# Patient Record
Sex: MALE | URBAN - METROPOLITAN AREA
[De-identification: names, ages, dates, MRNs, and addresses within clinical notes are randomized per-mention and may not be internally consistent; named-entity substitution may affect disease eponyms.]

---

## 2017-09-14 ENCOUNTER — INPATIENT (INPATIENT)
Facility: HOSPITAL | Age: 82
LOS: 0 days | Discharge: HOME | DRG: 379 | End: 2017-09-15
Attending: HOSPITALIST | Admitting: FAMILY MEDICINE
Payer: COMMERCIAL

## 2017-09-14 ENCOUNTER — RESULT REVIEW (OUTPATIENT)
Age: 82
End: 2017-09-14

## 2017-09-14 VITALS
WEIGHT: 173.94 LBS | OXYGEN SATURATION: 98 % | HEIGHT: 71 IN | RESPIRATION RATE: 18 BRPM | HEART RATE: 88 BPM | SYSTOLIC BLOOD PRESSURE: 150 MMHG | TEMPERATURE: 98 F | DIASTOLIC BLOOD PRESSURE: 80 MMHG

## 2017-09-14 DIAGNOSIS — Z96.641 PRESENCE OF RIGHT ARTIFICIAL HIP JOINT: Chronic | ICD-10-CM

## 2017-09-14 DIAGNOSIS — I48.2 CHRONIC ATRIAL FIBRILLATION: ICD-10-CM

## 2017-09-14 DIAGNOSIS — K92.2 GASTROINTESTINAL HEMORRHAGE, UNSPECIFIED: ICD-10-CM

## 2017-09-14 DIAGNOSIS — I10 ESSENTIAL (PRIMARY) HYPERTENSION: ICD-10-CM

## 2017-09-14 LAB
ABO RH CONFIRMATION: SIGNIFICANT CHANGE UP
ALBUMIN SERPL ELPH-MCNC: 3.8 G/DL — SIGNIFICANT CHANGE UP (ref 3.3–5.2)
ALP SERPL-CCNC: 114 U/L — SIGNIFICANT CHANGE UP (ref 40–120)
ALT FLD-CCNC: 40 U/L — SIGNIFICANT CHANGE UP
ANION GAP SERPL CALC-SCNC: 15 MMOL/L — SIGNIFICANT CHANGE UP (ref 5–17)
APTT BLD: 34 SEC — SIGNIFICANT CHANGE UP (ref 27.5–37.4)
AST SERPL-CCNC: 36 U/L — SIGNIFICANT CHANGE UP
BASOPHILS # BLD AUTO: 0 K/UL — SIGNIFICANT CHANGE UP (ref 0–0.2)
BASOPHILS NFR BLD AUTO: 0.1 % — SIGNIFICANT CHANGE UP (ref 0–2)
BILIRUB SERPL-MCNC: 0.8 MG/DL — SIGNIFICANT CHANGE UP (ref 0.4–2)
BLD GP AB SCN SERPL QL: SIGNIFICANT CHANGE UP
BUN SERPL-MCNC: 57 MG/DL — HIGH (ref 8–20)
CALCIUM SERPL-MCNC: 9.5 MG/DL — SIGNIFICANT CHANGE UP (ref 8.6–10.2)
CHLORIDE SERPL-SCNC: 99 MMOL/L — SIGNIFICANT CHANGE UP (ref 98–107)
CO2 SERPL-SCNC: 24 MMOL/L — SIGNIFICANT CHANGE UP (ref 22–29)
CREAT SERPL-MCNC: 0.79 MG/DL — SIGNIFICANT CHANGE UP (ref 0.5–1.3)
EOSINOPHIL # BLD AUTO: 0 K/UL — SIGNIFICANT CHANGE UP (ref 0–0.5)
EOSINOPHIL NFR BLD AUTO: 0.1 % — SIGNIFICANT CHANGE UP (ref 0–5)
GLUCOSE SERPL-MCNC: 132 MG/DL — HIGH (ref 70–115)
HCT VFR BLD CALC: 29.8 % — LOW (ref 42–52)
HCT VFR BLD CALC: 30.5 % — LOW (ref 42–52)
HCT VFR BLD CALC: 33.2 % — LOW (ref 42–52)
HGB BLD-MCNC: 10.2 G/DL — LOW (ref 14–18)
HGB BLD-MCNC: 11.3 G/DL — LOW (ref 14–18)
HGB BLD-MCNC: 9.9 G/DL — LOW (ref 14–18)
INR BLD: 1.84 RATIO — HIGH (ref 0.88–1.16)
INR BLD: 2.05 RATIO — HIGH (ref 0.88–1.16)
INR BLD: 2.63 RATIO — HIGH (ref 0.88–1.16)
LYMPHOCYTES # BLD AUTO: 0.8 K/UL — LOW (ref 1–4.8)
LYMPHOCYTES # BLD AUTO: 9.3 % — LOW (ref 20–55)
MCHC RBC-ENTMCNC: 32.9 PG — HIGH (ref 27–31)
MCHC RBC-ENTMCNC: 33 PG — HIGH (ref 27–31)
MCHC RBC-ENTMCNC: 33.2 G/DL — SIGNIFICANT CHANGE UP (ref 32–36)
MCHC RBC-ENTMCNC: 33.4 G/DL — SIGNIFICANT CHANGE UP (ref 32–36)
MCHC RBC-ENTMCNC: 33.4 PG — HIGH (ref 27–31)
MCHC RBC-ENTMCNC: 34 G/DL — SIGNIFICANT CHANGE UP (ref 32–36)
MCV RBC AUTO: 98.2 FL — HIGH (ref 80–94)
MCV RBC AUTO: 98.7 FL — HIGH (ref 80–94)
MCV RBC AUTO: 99 FL — HIGH (ref 80–94)
MONOCYTES # BLD AUTO: 1.1 K/UL — HIGH (ref 0–0.8)
MONOCYTES NFR BLD AUTO: 12.3 % — HIGH (ref 3–10)
NEUTROPHILS # BLD AUTO: 7 K/UL — SIGNIFICANT CHANGE UP (ref 1.8–8)
NEUTROPHILS NFR BLD AUTO: 78 % — HIGH (ref 37–73)
OB PNL STL: POSITIVE
PLATELET # BLD AUTO: 109 K/UL — LOW (ref 150–400)
PLATELET # BLD AUTO: 109 K/UL — LOW (ref 150–400)
PLATELET # BLD AUTO: 126 K/UL — LOW (ref 150–400)
POTASSIUM SERPL-MCNC: 3.8 MMOL/L — SIGNIFICANT CHANGE UP (ref 3.5–5.3)
POTASSIUM SERPL-SCNC: 3.8 MMOL/L — SIGNIFICANT CHANGE UP (ref 3.5–5.3)
PROT SERPL-MCNC: 6.4 G/DL — LOW (ref 6.6–8.7)
PROTHROM AB SERPL-ACNC: 20.5 SEC — HIGH (ref 9.8–12.7)
PROTHROM AB SERPL-ACNC: 22.9 SEC — HIGH (ref 9.8–12.7)
PROTHROM AB SERPL-ACNC: 29.5 SEC — HIGH (ref 9.8–12.7)
RBC # BLD: 3.01 M/UL — LOW (ref 4.6–6.2)
RBC # BLD: 3.09 M/UL — LOW (ref 4.6–6.2)
RBC # BLD: 3.38 M/UL — LOW (ref 4.6–6.2)
RBC # FLD: 13 % — SIGNIFICANT CHANGE UP (ref 11–15.6)
RBC # FLD: 13.3 % — SIGNIFICANT CHANGE UP (ref 11–15.6)
RBC # FLD: 13.3 % — SIGNIFICANT CHANGE UP (ref 11–15.6)
SODIUM SERPL-SCNC: 138 MMOL/L — SIGNIFICANT CHANGE UP (ref 135–145)
TYPE + AB SCN PNL BLD: SIGNIFICANT CHANGE UP
WBC # BLD: 8.9 K/UL — SIGNIFICANT CHANGE UP (ref 4.8–10.8)
WBC # BLD: 9.1 K/UL — SIGNIFICANT CHANGE UP (ref 4.8–10.8)
WBC # BLD: 9.8 K/UL — SIGNIFICANT CHANGE UP (ref 4.8–10.8)
WBC # FLD AUTO: 8.9 K/UL — SIGNIFICANT CHANGE UP (ref 4.8–10.8)
WBC # FLD AUTO: 9.1 K/UL — SIGNIFICANT CHANGE UP (ref 4.8–10.8)
WBC # FLD AUTO: 9.8 K/UL — SIGNIFICANT CHANGE UP (ref 4.8–10.8)

## 2017-09-14 PROCEDURE — 88305 TISSUE EXAM BY PATHOLOGIST: CPT | Mod: 26

## 2017-09-14 PROCEDURE — 99223 1ST HOSP IP/OBS HIGH 75: CPT

## 2017-09-14 PROCEDURE — 99223 1ST HOSP IP/OBS HIGH 75: CPT | Mod: 25

## 2017-09-14 PROCEDURE — 93010 ELECTROCARDIOGRAM REPORT: CPT

## 2017-09-14 PROCEDURE — 43753 TX GASTRO INTUB W/ASP: CPT

## 2017-09-14 PROCEDURE — 12345: CPT | Mod: NC

## 2017-09-14 PROCEDURE — 88342 IMHCHEM/IMCYTCHM 1ST ANTB: CPT | Mod: 26

## 2017-09-14 PROCEDURE — 99285 EMERGENCY DEPT VISIT HI MDM: CPT | Mod: 25

## 2017-09-14 PROCEDURE — 43239 EGD BIOPSY SINGLE/MULTIPLE: CPT

## 2017-09-14 PROCEDURE — 71010: CPT | Mod: 26

## 2017-09-14 RX ORDER — ATENOLOL 25 MG/1
25 TABLET ORAL DAILY
Qty: 0 | Refills: 0 | Status: DISCONTINUED | OUTPATIENT
Start: 2017-09-14 | End: 2017-09-15

## 2017-09-14 RX ORDER — SODIUM CHLORIDE 9 MG/ML
1000 INJECTION INTRAMUSCULAR; INTRAVENOUS; SUBCUTANEOUS ONCE
Qty: 0 | Refills: 0 | Status: COMPLETED | OUTPATIENT
Start: 2017-09-14 | End: 2017-09-14

## 2017-09-14 RX ORDER — PANTOPRAZOLE SODIUM 20 MG/1
40 TABLET, DELAYED RELEASE ORAL
Qty: 0 | Refills: 0 | Status: DISCONTINUED | OUTPATIENT
Start: 2017-09-14 | End: 2017-09-15

## 2017-09-14 RX ORDER — PANTOPRAZOLE SODIUM 20 MG/1
80 TABLET, DELAYED RELEASE ORAL ONCE
Qty: 0 | Refills: 0 | Status: COMPLETED | OUTPATIENT
Start: 2017-09-14 | End: 2017-09-14

## 2017-09-14 RX ORDER — ERYTHROMYCIN ETHYLSUCCINATE 400 MG
250 TABLET ORAL ONCE
Qty: 0 | Refills: 0 | Status: COMPLETED | OUTPATIENT
Start: 2017-09-14 | End: 2017-09-14

## 2017-09-14 RX ORDER — SODIUM CHLORIDE 9 MG/ML
1000 INJECTION INTRAMUSCULAR; INTRAVENOUS; SUBCUTANEOUS
Qty: 0 | Refills: 0 | Status: DISCONTINUED | OUTPATIENT
Start: 2017-09-14 | End: 2017-09-15

## 2017-09-14 RX ORDER — FENTANYL CITRATE 50 UG/ML
12 INJECTION INTRAVENOUS ONCE
Qty: 0 | Refills: 0 | Status: DISCONTINUED | OUTPATIENT
Start: 2017-09-14 | End: 2017-09-14

## 2017-09-14 RX ORDER — PANTOPRAZOLE SODIUM 20 MG/1
8 TABLET, DELAYED RELEASE ORAL
Qty: 80 | Refills: 0 | Status: DISCONTINUED | OUTPATIENT
Start: 2017-09-14 | End: 2017-09-14

## 2017-09-14 RX ORDER — ONDANSETRON 8 MG/1
4 TABLET, FILM COATED ORAL EVERY 6 HOURS
Qty: 0 | Refills: 0 | Status: DISCONTINUED | OUTPATIENT
Start: 2017-09-14 | End: 2017-09-15

## 2017-09-14 RX ORDER — ATORVASTATIN CALCIUM 80 MG/1
20 TABLET, FILM COATED ORAL AT BEDTIME
Qty: 0 | Refills: 0 | Status: DISCONTINUED | OUTPATIENT
Start: 2017-09-14 | End: 2017-09-15

## 2017-09-14 RX ADMIN — PANTOPRAZOLE SODIUM 80 MILLIGRAM(S): 20 TABLET, DELAYED RELEASE ORAL at 03:30

## 2017-09-14 RX ADMIN — FENTANYL CITRATE 12 MICROGRAM(S): 50 INJECTION INTRAVENOUS at 04:20

## 2017-09-14 RX ADMIN — ATENOLOL 25 MILLIGRAM(S): 25 TABLET ORAL at 18:22

## 2017-09-14 RX ADMIN — Medication 250 MILLIGRAM(S): at 08:35

## 2017-09-14 RX ADMIN — SODIUM CHLORIDE 100 MILLILITER(S): 9 INJECTION INTRAMUSCULAR; INTRAVENOUS; SUBCUTANEOUS at 03:49

## 2017-09-14 RX ADMIN — PANTOPRAZOLE SODIUM 40 MILLIGRAM(S): 20 TABLET, DELAYED RELEASE ORAL at 18:24

## 2017-09-14 RX ADMIN — PANTOPRAZOLE SODIUM 10 MG/HR: 20 TABLET, DELAYED RELEASE ORAL at 03:35

## 2017-09-14 RX ADMIN — FENTANYL CITRATE 12 MICROGRAM(S): 50 INJECTION INTRAVENOUS at 03:57

## 2017-09-14 RX ADMIN — SODIUM CHLORIDE 1000 MILLILITER(S): 9 INJECTION INTRAMUSCULAR; INTRAVENOUS; SUBCUTANEOUS at 02:43

## 2017-09-14 NOTE — CONSULT NOTE ADULT - SUBJECTIVE AND OBJECTIVE BOX
[facility]                                   [address] [place] [address]                                                      PHONE: [phone]                                                         FAX: [phone]  [email or web address]  ---------------------------------------------------------------------------------------------------------------------------------    Reason for Consult: pre op evaluation hx chronic A fib     HPI:  WILFRED SIGALA is an 87y Male with history of HTN CVA chronic A fib on AC HLD remote DVT now presents with several episodes of bloody vomitus that started around 5 pm on 9/13/17.  The patient otherwise is feeling well > 4 METS.  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 1000 milliLiter(s) (100 mL/Hr) IV Continuous <Continuous>  erythromycin   IVPB 250 milliGRAM(s) IV Intermittent once    MEDICATIONS  (PRN):  ondansetron Injectable 4 milliGRAM(s) IV Push every 6 hours PRN Nausea and/or Vomiting      Social History:  Cigarettes:       none             Alchohol:        none         Illicit Drug Abuse:  none    ROS: Negative other than as mentioned in HPI.    Vital Signs Last 24 Hrs  T(C): 36.3 (14 Sep 2017 05:29), Max: 36.6 (14 Sep 2017 01:00)  T(F): 97.4 (14 Sep 2017 05:29), Max: 97.8 (14 Sep 2017 01:00)  HR: 65 (14 Sep 2017 05:29) (65 - 88)  BP: 162/73 (14 Sep 2017 05:29) (134/68 - 162/73)  BP(mean): --  RR: 18 (14 Sep 2017 05:29) (18 - 18)  SpO2: 97% (14 Sep 2017 05:29) (97% - 98%)  ICU Vital Signs Last 24 Hrs  WILFRED SIGALA  I&O's Detail    13 Sep 2017 07:01  -  14 Sep 2017 07:00  --------------------------------------------------------  IN:    Enteral Tube Flush: 500 mL    Sodium Chloride 0.9% IV Bolus: 400 mL    sodium chloride 0.9%.: 100 mL  Total IN: 1000 mL    OUT:  Total OUT: 0 mL    Total NET: 1000 mL        I&O's Summary    13 Sep 2017 07:01  -  14 Sep 2017 07:00  --------------------------------------------------------  IN: 1000 mL / OUT: 0 mL / NET: 1000 mL      Drug Dosing Weight  WILFRED SIGALA      PHYSICAL EXAM:  General: Appears well developed, well nourished alert and cooperative.  HEENT: Head; normocephalic, atraumatic.  Eyes: Pupils reactive, cornea wnl.  Neck: Supple, no nodes adenopathy, no NVD or carotid bruit or thyromegaly.  CARDIOVASCULAR: Normal S1 and S2, 2/6 murmur, rub, gallop or lift.   LUNGS: No rales, rhonchi or wheeze. Normal breath sounds bilaterally.  ABDOMEN: Soft, nontender without mass or organomegaly. bowel sounds normoactive.  EXTREMITIES: No clubbing, cyanosis or edema. Distal pulses wnl.   SKIN: warm and dry with normal turgor.  NEURO: Alert/oriented x 3/normal motor exam. No pathologic reflexes.    PSYCH: normal affect.        LABS:                        10.2   8.9   )-----------( 109      ( 14 Sep 2017 07:05 )             30.5     09-14    138  |  99  |  57.0<H>  ----------------------------<  132<H>  3.8   |  24.0  |  0.79    Ca    9.5      14 Sep 2017 02:34    TPro  6.4<L>  /  Alb  3.8  /  TBili  0.8  /  DBili  x   /  AST  36  /  ALT  40  /  AlkPhos  114  09-14    WILFRED SIGALA      PT/INR - ( 14 Sep 2017 08:43 )   PT: 22.9 sec;   INR: 2.05 ratio         PTT - ( 14 Sep 2017 02:34 )  PTT:34.0 sec      RADIOLOGY & ADDITIONAL STUDIES:    INTERPRETATION OF TELEMETRY (personally reviewed):    ECG: A fib rate control     ECHO: 2016  normal EF mild LVH DDD moderate AI without AS moderate to severe MR/TR mild PHTN    STRESS TEST: 2011 nuclear stress normal perfusion       Assessment and Plan:  In summary, WILFRED SIGALA is an 87y Male with past medical history significant for HTN CVA chronic A fib on AC remote DVT now with significant upper GI bleed awaiting urgent EDG low risk procedure in which patient with moderate CV risk; no evidence of ACS or heart failure thus no active CV contraindications to GI procedure.  Agree with FFP and continue current CV medications

## 2017-09-14 NOTE — H&P ADULT - GASTROINTESTINAL DETAILS
rectal deferred by pt. one done by ER physician is guaic + ve../bowel sounds normal/soft/no distention/nontender

## 2017-09-14 NOTE — H&P ADULT - PROBLEM SELECTOR PLAN 1
pt. has ng tube, on protonix drip, got 2 units ffp in ER. will hold coumadin and celebrex. transfuse as needed. H/H Q 6HR. GI CON[facility] [place].

## 2017-09-14 NOTE — H&P ADULT - ENMT COMMENTS
oral mucosa somewhat dry, no blood noted in oral cavity. no ear discharge. oral mucosa somewhat dry, no blood noted in oral cavity. no ear discharge. + ng tube draining dark reddish  liquid

## 2017-09-14 NOTE — ED ADULT NURSE NOTE - NS TRANSFER PATIENT BELONGINGS
Clothing/Cell Phone/PDA (specify) clothing and personal belongings sent to security by ANKUR Martin pt remains with eye glasses and cell phone upon request./Clothing/Cell Phone/PDA (specify)

## 2017-09-14 NOTE — ED ADULT NURSE NOTE - PMH
Atrial fibrillation    Deaf, left    Hypercholesterolemia    Hypertension    Stroke  x2 (left ear deaf as residual)

## 2017-09-14 NOTE — ED PROVIDER NOTE - ATTENDING CONTRIBUTION TO CARE
88 yo M presents to ED c/o 2 episodes of painless hematemesis at home 1700 yesterday.  Pt with previous hx of UGI in the past while on ASA.  Pt with hx of A-fib and currently on Coumadin.  Pt also admits to dark stools.  Pt denies any assoc abd pain or fever.  On exam Abd soft with no localized tenderness.  Will check labs, NGT, IV Protonix and consult ICU

## 2017-09-14 NOTE — CONSULT NOTE ADULT - SUBJECTIVE AND OBJECTIVE BOX
87y  Male  No Known Allergies    CC; Patient is a 87y old  Male who presented with a chief complaint of coffee ground emisis    HPI:    I was asked to evaluate for ICU admission this 86 y/o male who came to ER as he started with upper GI bleeding in the form of coffee ground emesis for 5 episodes started around 5 pm on 9/13/17. He denies  abdominal pain, state he had some blood per rectum in the form of dark stools. He states that he had similar episode years ago when he was on aspirin. he is now on coumadin for his afib. no other complaints is resting comfortably and is hemodynamically stable vitals with no signs of active bleeding.     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)  History of total right hip replacement    FAMILY HISTORY:  Family history of active rheumatic fever (Father)      Vitals   Vital Signs Last 24 Hrs  T(C): 36.3 (14 Sep 2017 05:29), Max: 36.6 (14 Sep 2017 01:00)  T(F): 97.4 (14 Sep 2017 05:29), Max: 97.8 (14 Sep 2017 01:00)  HR: 65 (14 Sep 2017 05:29) (65 - 88)  BP: 162/73 (14 Sep 2017 05:29) (134/68 - 162/73)  BP(mean): --  RR: 18 (14 Sep 2017 05:29) (18 - 18)  SpO2: 97% (14 Sep 2017 05:29) (97% - 98%)    I&O's Summary    13 Sep 2017 07:01  -  14 Sep 2017 07:00  --------------------------------------------------------  IN: 1000 mL / OUT: 0 mL / NET: 1000 mL        LABS  09-14    138  |  99  |  57.0<H>  ----------------------------<  132<H>  3.8   |  24.0  |  0.79    Ca    9.5      14 Sep 2017 02:34    TPro  6.4<L>  /  Alb  3.8  /  TBili  0.8  /  DBili  x   /  AST  36  /  ALT  40  /  AlkPhos  114  09-14                          10.2   8.9   )-----------( 109      ( 14 Sep 2017 07:05 )             30.5     PT/INR - ( 14 Sep 2017 08:43 )   PT: 22.9 sec;   INR: 2.05 ratio         PTT - ( 14 Sep 2017 02:34 )  PTT:34.0 sec      LIVER FUNCTIONS - ( 14 Sep 2017 02:34 )  Alb: 3.8 g/dL / Pro: 6.4 g/dL / ALK PHOS: 114 U/L / ALT: 40 U/L / AST: 36 U/L / GGT: x           CAPILLARY BLOOD GLUCOSE    VENT SETTINGS   none       Meds  MEDICATIONS  (STANDING):  pantoprazole Infusion 8 mG/Hr (10 mL/Hr) IV Continuous <Continuous>  sodium chloride 0.9%. 1000 milliLiter(s) (100 mL/Hr) IV Continuous <Continuous>  erythromycin   IVPB 250 milliGRAM(s) IV Intermittent once      REVIEW OF SYSTEMS:    CONSTITUTIONAL: No fever, weight loss, or fatigue  EYES: No eye pain, visual disturbances, or discharge  ENMT:  No difficulty hearing, tinnitus, vertigo; No sinus or throat pain  NECK: No pain or stiffness  RESPIRATORY: No cough, wheezing, chills or hemoptysis; No shortness of breath  CARDIOVASCULAR: No chest pain, palpitations, dizziness, or leg swelling  GASTROINTESTINAL: No abdominal or epigastric pain. has had nausea with  hematemesis; No diarrhea or constipation.  possitive melena   GENITOURINARY: No dysuria, frequency, hematuria, or incontinence  NEUROLOGICAL: No headaches, memory loss, loss of strength, numbness, or tremors  SKIN: No itching, burning, rashes, or lesions   LYMPH NODES: No enlarged glands  MUSCULOSKELETAL: No joint pain or swelling; No muscle, back, or extremity pain  PSYCHIATRIC: No depression, anxiety, mood swings, or difficulty sleeping  ALLERY AND IMMUNOLOGIC: No hives or eczema      Physicial Exam:     Constitutional: NAD, well-groomed, well-developed  HEENT: PERRLA, EOMI, no drainage or redness  Neck: No bruits; no thyromegaly or nodules,  No JVD  Back: Normal spine flexure, No CVA tenderness, No deformity or limitation of movement  Respiratory: Breath Sounds equal & clear to percussion & auscultation, no accessory muscle use  Cardiovascular: Regular rate & rhythm, normal S1, S2; no murmurs, gallops or rubs; no S3, S4  Gastrointestinal: Soft, non-tender, non distended no hepatosplenomegaly, normal bowel sounds  Extremities: No peripheral edema, No cyanosis, clubbing   Vascular: Equal and normal pulses: 2+ peripheral pulses throughout  Neurological:   A&O x 3; no sensory, motor  deficits, normal reflexes  Psychiatric: Normal mood, normal affect  Musculoskeletal: No joint pain, swelling or deformity; no limitation of movement  Skin: No rashes

## 2017-09-14 NOTE — ED PROVIDER NOTE - PROGRESS NOTE DETAILS
PA NOTE: PT informed of all results and the need for admission, pt agrees to the need to be treated, pt will be admitted to step down unit, PT agrees to admission and the possibility of needing further treatments. PA NOTE: PT with positive stool guiac, NG tube will be placed. PA NOTE: PT with positive stool guaiac, NG tube placed, tube had coffee-ground.

## 2017-09-14 NOTE — ED ADULT NURSE NOTE - OBJECTIVE STATEMENT
pt reports around 5pm last night had sudden onset of nausea, vomiting and diarrhea. pt described the vomit and diarrhea as crimson purple in color, as he has had in the past about 15 years ago "when I over did the aspirin". pt denies any pain associated with these episodes and presently.

## 2017-09-14 NOTE — CONSULT NOTE ADULT - ASSESSMENT
87 year old man with hemetemesis and melena with history of coumadin and aspirin use. He received 2 units of FFP. Fortunately, bleeding has slowed down. Major differential is PUD, erosive esophagitis, gastritis, duodenitis, unlikely malignancy.     I will recommend    1. 2 large bore IV lines  2. Recheck CBC, PT/INR. Give additional FFP if INR is >1.5  3. Obtain cardiology evaluation  4. Transfuse PRBC if needed to keep Hct >24  5. Hold coumadin  6. Avoid any antiplatelets  7. IV erythromycin 250 mg once  8. Possible EGD today with anesthesia assistance

## 2017-09-14 NOTE — PROGRESS NOTE ADULT - SUBJECTIVE AND OBJECTIVE BOX
WILFRED SIGALA    [id number]    87y      Male    INTERVAL HPI/OVERNIGHT EVENTS:  88 y/o male came to ER as he started having blood vomitus about 5 episodes started around 5 pm on 9/13/17. last couple episodes were lighter than first few. About 2 hours before coming to ER bloody vomitus subsided. no abd. pain. no cp. no blood per rectum.  no dizziness reported.pt. has similar episode about 15 years ago when he was on aspirin. Pt. is now on coumadin for his afib. no other complaints.     This morning, pt with no new complaints. Resting comfortably in bed with NGT - no further vomiting, no blood per rectum, Denies abd pain. Awaiting EGD.    REVIEW OF SYSTEMS:    CONSTITUTIONAL: No fever, weight loss, or fatigue  RESPIRATORY: No cough, wheezing, hemoptysis; No shortness of breath  CARDIOVASCULAR: No chest pain, palpitations  GASTROINTESTINAL: No abdominal or epigastric pain. + nausea, + vomiting  NEUROLOGICAL: No headaches, memory loss, loss of strength.      Vital Signs Last 24 Hrs  T(C): 36.3 (14 Sep 2017 05:29), Max: 36.6 (14 Sep 2017 01:00)  T(F): 97.4 (14 Sep 2017 05:29), Max: 97.8 (14 Sep 2017 01:00)  HR: 65 (14 Sep 2017 05:29) (65 - 88)  BP: 162/73 (14 Sep 2017 05:29) (134/68 - 162/73)  RR: 18 (14 Sep 2017 05:29) (18 - 18)  SpO2: 97% (14 Sep 2017 05:29) (97% - 98%)    PHYSICAL EXAM:    GENERAL: NAD, elderly, frail  HEENT: PERRL, +EOMI  NECK: soft, Supple, No JVD,   CHEST/LUNG: Clear to ascultation bilaterally; No wheezing  HEART: S1S2+, irregularly irregular; + murmur  ABDOMEN: Soft, Nontender, Nondistended; Bowel sounds present, NGT in place to low suction- dark drainage  EXTREMITIES:  2+ Peripheral Pulses, No clubbing, cyanosis, or edema  SKIN: No rashes or lesions  NEURO: AAOX3, no focal deficits, no motor r sensory loss  PSYCH: normal mood      LABS:                        10.2   8.9   )-----------( 109      ( 14 Sep 2017 07:05 )             30.5     09-14    138  |  99  |  57.0<H>  ----------------------------<  132<H>  3.8   |  24.0  |  0.79    Ca    9.5      14 Sep 2017 02:34    TPro  6.4<L>  /  Alb  3.8  /  TBili  0.8  /  DBili  x   /  AST  36  /  ALT  40  /  AlkPhos  114  09-14    PT/INR - ( 14 Sep 2017 08:43 )   PT: 22.9 sec;   INR: 2.05 ratio         PTT - ( 14 Sep 2017 02:34 )  PTT:34.0 sec        MEDICATIONS  (STANDING):  pantoprazole Infusion 8 mG/Hr (10 mL/Hr) IV Continuous <Continuous>  sodium chloride 0.9%. 1000 milliLiter(s) (100 mL/Hr) IV Continuous <Continuous>    MEDICATIONS  (PRN):  ondansetron Injectable 4 milliGRAM(s) IV Push every 6 hours PRN Nausea and/or Vomiting      RADIOLOGY & ADDITIONAL TESTS:    CXR:  Cardiomegaly.    No infiltrates.    Cardiology and GI notes appreciated

## 2017-09-14 NOTE — BRIEF OPERATIVE NOTE - POST-OP DX
Acute gastric ulcer with hemorrhage  09/14/2017    Active  Emory Hernandez  Esophagitis, acute  09/14/2017    Active  Emory Hernandez

## 2017-09-14 NOTE — BRIEF OPERATIVE NOTE - COMMENTS
PPI bid therapy  Clear liquid diet  Treat for H. pylori if positive  Consider repeating EGD in 3 months to document ulcer healing

## 2017-09-14 NOTE — H&P ADULT - HISTORY OF PRESENT ILLNESS
86 y/o male came to ER as he started having blood vomitus about 5 episodes started around 5 pm on 9/13/17. last couple episodes were lighter than first few. About 2 hours before coming to ER bloody vomitus subsided. no abd. pain. no cp. no blood per rectum.  no dizziness reported.pt. has similar episode about 15 years ago when he was on aspirin. Pt. is now on coumadin for his afib. no other complaints.

## 2017-09-14 NOTE — ED PROVIDER NOTE - OBJECTIVE STATEMENT
PT with SPMHx of GI bleed 10 yr ago, A-fib on coumadin, PT with SPMHx of GI bleed 10 yr ago, A-fib on coumadin, presenting to the ED with 2 incidents of vomiting blood 5pm in the afternoon. PT states that the vomits was crimson red and that he has been having BM of the same color. PT states that he has been having bloody  PT states that he is not having any ABd pain but is worried because he has had a decreased appetite, dizziness. PT denies, SOB, CP, Diff breathing, diarrhea, urinary symptoms, back pain. PT with SPMHx of GI bleed 10 yr ago, A-fib on coumadin, presenting to the ED with 2 incidents of vomiting blood 5pm in the afternoon. PT states that the vomits was crimson red and that he has been having BM of the same color. PT states that he has been having bloody stools for a few weeks. PT states that he is not having any ABd pain but is worried because he has had a decreased appetite, dizziness. PT denies, SOB, CP, Diff breathing, diarrhea, urinary symptoms, back pain.

## 2017-09-14 NOTE — PROGRESS NOTE ADULT - PROBLEM SELECTOR PLAN 1
pt. has ng tube, on protonix drip, got 2 units ffp in ER. will hold coumadin and celebrex. transfuse as needed. H/H Q 6HR. GI consulted - possible EGD today

## 2017-09-14 NOTE — ED ADULT TRIAGE NOTE - CHIEF COMPLAINT QUOTE
Pt with vomiting "crimson colored blood and pooping the same color starting tonight", denies abd pain, c/o slight SOB

## 2017-09-14 NOTE — CONSULT NOTE ADULT - SUBJECTIVE AND OBJECTIVE BOX
HPI:  86 y/o pleasant  man came to ER as he started having blood vomitus about 5 episodes started around 5 pm on 9/13/17. He also had melanotic stools. He denies any abdominal pain, chest pain, dizziness or syncopal episodes. He had similar episode about 15 years ago when he was on aspirin. He was admitted to [facility]. No EGD was performed. Pt. is now on coumadin for his afib. He also takes celebrex. He lives at home and takes care of his daughter. He had a colonoscopy performed about 8 years ago and it was normal. He has history of CVA twice and that is why he is on coumadin. He had no residual deficits except hearing loss in left ear. He had a NG tube placed. No repeat episodes of melena. 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  No dyspepsia  No abdominal pain.    No Nausea, +vomiting.  +Bleeding.  +hematemesis +melena  No diarrhea.    No hematochesia.  No weight loss, anorexia.  No edema.      HOME MEDICATIONS:    Coumadin  Celebrex  Crestor  HCTZ  Atenolol  Biotin  Ferrous sulfate  Centrum  Calcitrate    MEDICATIONS  (STANDING):  pantoprazole Infusion 8 mG/Hr (10 mL/Hr) IV Continuous <Continuous>  sodium chloride 0.9%. 1000 milliLiter(s) (100 mL/Hr) IV Continuous <Continuous>  erythromycin   IVPB 250 milliGRAM(s) IV Intermittent once    MEDICATIONS  (PRN):  ondansetron Injectable 4 milliGRAM(s) IV Push every 6 hours PRN Nausea and/or Vomiting      Allergies    No Known Allergies    Intolerances        SOCIAL HISTORY: Ex smoker. Stopped 58 years ago. Ex alchol use. 25 yeara ago. No drugs    ENDOSCOPIC/GI HISTORY: Colonoscopy 8 years ago. No EGD    FAMILY HISTORY:  Family history of active rheumatic fever (Father)  Sister with history of colon cancer      Vital Signs Last 24 Hrs  T(C): 36.3 (14 Sep 2017 05:29), Max: 36.6 (14 Sep 2017 01:00)  T(F): 97.4 (14 Sep 2017 05:29), Max: 97.8 (14 Sep 2017 01:00)  HR: 65 (14 Sep 2017 05:29) (65 - 88)  BP: 162/73 (14 Sep 2017 05:29) (134/68 - 162/73)  BP(mean): --  RR: 18 (14 Sep 2017 05:29) (18 - 18)  SpO2: 97% (14 Sep 2017 05:29) (97% - 98%)    PHYSICAL EXAM:    GENERAL: NAD, well-groomed, well-developed. NG tube in place  HEAD:  Atraumatic, Normocephalic  EYES: EOMI, PERRLA, conjunctiva and sclera clear  ENMT: No tonsillar erythema, exudates, or enlargement; Moist mucous membranes, Good dentition, No lesions  NECK: Supple, No JVD, Normal thyroid  CHEST/LUNG: Clear to percussion bilaterally; No rales, rhonchi, wheezing, or rubs  HEART: Apical and pulmonary area murmur. No rubs, or gallops  ABDOMEN: Soft, Nontender, Nondistended; Bowel sounds present  RECTAL: Refused. It was already performed by the ER physician. Blood +  EXTREMITIES:  2+ Peripheral Pulses, No clubbing, cyanosis, or edema  LYMPH: No lymphadenopathy noted  SKIN: No rashes or lesions      LABS:                        11.3   9.8   )-----------( 126      ( 14 Sep 2017 02:34 )             33.2     09-14    138  |  99  |  57.0<H>  ----------------------------<  132<H>  3.8   |  24.0  |  0.79    Ca    9.5      14 Sep 2017 02:34    TPro  6.4<L>  /  Alb  3.8  /  TBili  0.8  /  DBili  x   /  AST  36  /  ALT  40  /  AlkPhos  114  09-14    PT/INR - ( 14 Sep 2017 02:34 )   PT: 29.5 sec;   INR: 2.63 ratio         PTT - ( 14 Sep 2017 02:34 )  PTT:34.0 sec       LIVER FUNCTIONS - ( 14 Sep 2017 02:34 )  Alb: 3.8 g/dL / Pro: 6.4 g/dL / ALK PHOS: 114 U/L / ALT: 40 U/L / AST: 36 U/L / GGT: x               RADIOLOGY & ADDITIONAL STUDIES:

## 2017-09-14 NOTE — CONSULT NOTE ADULT - ASSESSMENT
87 year old male with upper GI bleeding in form of coffee ground emesis, put out 1 liter of coffee form emesis from insertion of NGT but has not had active bleeding in the ER. Pt is hemodynamically stable vital signs ,no complaints, is fully alert and oriented with stable H/H. Pt at this time does not meet the criteria for admission to ICU.     Recommend admission to step down unit for close monitoring , continue with ng tube, start protonix drip, transfuse 1 unit fresh frozen plasma to lower INR in setting of GI bleeding , call GI consult, hold coumadin, trend H&H closely and transfuse PRBC when hemoglobin significantly drops or signs of bleeding appear.  The above was reviewd with EICU attending Dr Feldman. Please reconsult the MICU team if pt condition declines    Critical Care time: 32 mins assessing presenting problems of acute illness, Medical decision making inculding recommending plan of care, reviewing data, reviewing radiology, discussing with multidisciplinary team, non inclusive of procedures, discussing goals of care with patient.

## 2017-09-14 NOTE — ED ADULT NURSE REASSESSMENT NOTE - NS ED NURSE REASSESS COMMENT FT1
Patient resting in bed comfortably, awake, alert and oriented X3, resp even/unlabored, lungs CTAB, VS stable, afebrile, IV fluids infusing well, cardiac monitor in progress, patient denies any distress, will continue to monitor patient.
Patient received awake and alert x4 in room 17L.  Patient RODRIGUEZ.  Patient has patent iv site in left and right forearm 20g.  Patient has Protonix drip infusing with no difficulty.  Patient denies pain of any kind.  Patient on cardiac monitor reads 70 HR.  Patient O2 sat reads 97% on RA.  Patient denies SOB.  Patient has no labored breathing, patient is in no acute distress.

## 2017-09-14 NOTE — ED PROCEDURE NOTE - CPROC ED GASTRIC INTUB DETAIL1
Gastric tube connected to low continuous suction./The nasogastric tube (see size above) was inserted via the anatomic location./Placement was confirmed by aspiration of gastric secretions./Bowel sounds present to 4 quadrants.

## 2017-09-15 ENCOUNTER — TRANSCRIPTION ENCOUNTER (OUTPATIENT)
Age: 82
End: 2017-09-15

## 2017-09-15 VITALS
DIASTOLIC BLOOD PRESSURE: 61 MMHG | RESPIRATION RATE: 16 BRPM | SYSTOLIC BLOOD PRESSURE: 155 MMHG | HEART RATE: 55 BPM | OXYGEN SATURATION: 99 %

## 2017-09-15 LAB
ANION GAP SERPL CALC-SCNC: 13 MMOL/L — SIGNIFICANT CHANGE UP (ref 5–17)
BUN SERPL-MCNC: 32 MG/DL — HIGH (ref 8–20)
CALCIUM SERPL-MCNC: 8.4 MG/DL — LOW (ref 8.6–10.2)
CHLORIDE SERPL-SCNC: 107 MMOL/L — SIGNIFICANT CHANGE UP (ref 98–107)
CO2 SERPL-SCNC: 23 MMOL/L — SIGNIFICANT CHANGE UP (ref 22–29)
CREAT SERPL-MCNC: 0.74 MG/DL — SIGNIFICANT CHANGE UP (ref 0.5–1.3)
GLUCOSE SERPL-MCNC: 94 MG/DL — SIGNIFICANT CHANGE UP (ref 70–115)
HCT VFR BLD CALC: 30.4 % — LOW (ref 42–52)
HCT VFR BLD CALC: 31.3 % — LOW (ref 42–52)
HGB BLD-MCNC: 9.7 G/DL — LOW (ref 14–18)
HGB BLD-MCNC: 9.9 G/DL — LOW (ref 14–18)
INR BLD: 2.37 RATIO — HIGH (ref 0.88–1.16)
MCHC RBC-ENTMCNC: 31.6 G/DL — LOW (ref 32–36)
MCHC RBC-ENTMCNC: 33 PG — HIGH (ref 27–31)
MCV RBC AUTO: 104.3 FL — HIGH (ref 80–94)
PLATELET # BLD AUTO: 108 K/UL — LOW (ref 150–400)
POTASSIUM SERPL-MCNC: 3.4 MMOL/L — LOW (ref 3.5–5.3)
POTASSIUM SERPL-SCNC: 3.4 MMOL/L — LOW (ref 3.5–5.3)
PROTHROM AB SERPL-ACNC: 26.5 SEC — HIGH (ref 9.8–12.7)
RBC # BLD: 3 M/UL — LOW (ref 4.6–6.2)
RBC # FLD: 13.1 % — SIGNIFICANT CHANGE UP (ref 11–15.6)
SODIUM SERPL-SCNC: 143 MMOL/L — SIGNIFICANT CHANGE UP (ref 135–145)
WBC # BLD: 8.4 K/UL — SIGNIFICANT CHANGE UP (ref 4.8–10.8)
WBC # FLD AUTO: 8.4 K/UL — SIGNIFICANT CHANGE UP (ref 4.8–10.8)

## 2017-09-15 PROCEDURE — 99239 HOSP IP/OBS DSCHRG MGMT >30: CPT

## 2017-09-15 RX ORDER — PANTOPRAZOLE SODIUM 20 MG/1
40 TABLET, DELAYED RELEASE ORAL
Qty: 0 | Refills: 0 | Status: DISCONTINUED | OUTPATIENT
Start: 2017-09-15 | End: 2017-09-15

## 2017-09-15 RX ORDER — POTASSIUM CHLORIDE 20 MEQ
40 PACKET (EA) ORAL ONCE
Qty: 0 | Refills: 0 | Status: COMPLETED | OUTPATIENT
Start: 2017-09-15 | End: 2017-09-15

## 2017-09-15 RX ORDER — PANTOPRAZOLE SODIUM 20 MG/1
1 TABLET, DELAYED RELEASE ORAL
Qty: 60 | Refills: 0 | OUTPATIENT
Start: 2017-09-15 | End: 2017-10-15

## 2017-09-15 RX ORDER — WARFARIN SODIUM 2.5 MG/1
1 TABLET ORAL
Qty: 0 | Refills: 0 | COMMUNITY
Start: 2017-09-15

## 2017-09-15 RX ORDER — CELECOXIB 200 MG/1
1 CAPSULE ORAL
Qty: 0 | Refills: 0 | COMMUNITY

## 2017-09-15 RX ADMIN — Medication 40 MILLIEQUIVALENT(S): at 14:43

## 2017-09-15 RX ADMIN — ATENOLOL 25 MILLIGRAM(S): 25 TABLET ORAL at 05:51

## 2017-09-15 RX ADMIN — PANTOPRAZOLE SODIUM 40 MILLIGRAM(S): 20 TABLET, DELAYED RELEASE ORAL at 05:51

## 2017-09-15 RX ADMIN — SODIUM CHLORIDE 100 MILLILITER(S): 9 INJECTION INTRAMUSCULAR; INTRAVENOUS; SUBCUTANEOUS at 02:44

## 2017-09-15 RX ADMIN — ATORVASTATIN CALCIUM 20 MILLIGRAM(S): 80 TABLET, FILM COATED ORAL at 00:45

## 2017-09-15 NOTE — DISCHARGE NOTE ADULT - CARE PLAN
Principal Discharge DX:	Gastrointestinal hemorrhage, unspecified gastrointestinal hemorrhage type  Goal:	To prevent further episodes of GI bleed.  Instructions for follow-up, activity and diet:	Please continue with medication as prescribed. Follow up with your primary medical doctor and GI Dr Hernandez within 1 week.  Secondary Diagnosis:	Chronic atrial fibrillation  Instructions for follow-up, activity and diet:	Hold coumadin, c/w other home medication. Follow up with your cardiology in 1 week of discharge for further plan on watchman procedure.  Secondary Diagnosis:	Essential hypertension  Instructions for follow-up, activity and diet:	Please continue with medication as prescribed. Follow up with your primary medical doctor within 1 week.  Secondary Diagnosis:	Hypercholesterolemia  Instructions for follow-up, activity and diet:	Please continue with medication as prescribed. Follow up with your primary medical doctor within 1 week.  Secondary Diagnosis:	Cerebrovascular accident (CVA), unspecified mechanism Principal Discharge DX:	Gastrointestinal hemorrhage, unspecified gastrointestinal hemorrhage type  Goal:	To prevent further episodes of GI bleed.  Instructions for follow-up, activity and diet:	Please continue with medication as prescribed. Follow up with your primary medical doctor and GI Dr Hernandez within 1 week.  Secondary Diagnosis:	Chronic atrial fibrillation  Instructions for follow-up, activity and diet:	C/w home medication including coumadin, take 2 mg tonight, 2 mg tomorrow and 3 mg on Sunday night, follow up with your PMD on Monday to check INR and adjust coumadin dose based on INR. Follow up with your cardiology in 1 week of discharge for further plan on watchman procedure.  Secondary Diagnosis:	Essential hypertension  Instructions for follow-up, activity and diet:	Please continue with medication as prescribed. Follow up with your primary medical doctor within 1 week.  Secondary Diagnosis:	Hypercholesterolemia  Instructions for follow-up, activity and diet:	Please continue with medication as prescribed. Follow up with your primary medical doctor within 1 week.  Secondary Diagnosis:	Cerebrovascular accident (CVA), unspecified mechanism

## 2017-09-15 NOTE — DISCHARGE NOTE ADULT - ADDITIONAL INSTRUCTIONS
Follow up with Gi, cardiology and PMD as mentioned above. Follow up with GI, cardiology and PMD as mentioned above.  have your PMD to check INR and also CBC to look for platelets counts.

## 2017-09-15 NOTE — PROGRESS NOTE ADULT - SUBJECTIVE AND OBJECTIVE BOX
INTERVAL HPI/OVERNIGHT EVENTS:    MEDICATIONS  (STANDING):  sodium chloride 0.9%. 1000 milliLiter(s) (100 mL/Hr) IV Continuous <Continuous>  atorvastatin 20 milliGRAM(s) Oral at bedtime  ATENolol  Tablet 25 milliGRAM(s) Oral daily  pantoprazole    Tablet 40 milliGRAM(s) Oral two times a day before meals    MEDICATIONS  (PRN):  ondansetron Injectable 4 milliGRAM(s) IV Push every 6 hours PRN Nausea and/or Vomiting      Allergies    No Known Allergies    Intolerances        Vital Signs Last 24 Hrs  T(C): 36.6 (15 Sep 2017 04:45), Max: 37 (15 Sep 2017 02:22)  T(F): 97.9 (15 Sep 2017 04:45), Max: 98.6 (15 Sep 2017 02:22)  HR: 68 (15 Sep 2017 05:00) (50 - 68)  BP: 140/62 (15 Sep 2017 05:00) (140/62 - 157/70)  BP(mean): 83 (15 Sep 2017 05:00) (83 - 93)  RR: 25 (15 Sep 2017 05:00) (20 - 25)  SpO2: 98% (15 Sep 2017 05:00) (92% - 100%)    LABS:                        9.7    x     )-----------( x        ( 15 Sep 2017 01:23 )             30.4     09-14    138  |  99  |  57.0<H>  ----------------------------<  132<H>  3.8   |  24.0  |  0.79    Ca    9.5      14 Sep 2017 02:34    TPro  6.4<L>  /  Alb  3.8  /  TBili  0.8  /  DBili  x   /  AST  36  /  ALT  40  /  AlkPhos  114  09-14    PT/INR - ( 14 Sep 2017 13:46 )   PT: 20.5 sec;   INR: 1.84 ratio         PTT - ( 14 Sep 2017 02:34 )  PTT:34.0 sec      RADIOLOGY & ADDITIONAL TESTS: INTERVAL HPI/OVERNIGHT EVENTS: F/u for GI bleed. On EGD -clean based gastric antral ulcer. Mild esophagitis. No complaints. No repeat episodes of vomiting, melena. Hct stable.     MEDICATIONS  (STANDING):  sodium chloride 0.9%. 1000 milliLiter(s) (100 mL/Hr) IV Continuous <Continuous>  atorvastatin 20 milliGRAM(s) Oral at bedtime  ATENolol  Tablet 25 milliGRAM(s) Oral daily  pantoprazole    Tablet 40 milliGRAM(s) Oral two times a day before meals    MEDICATIONS  (PRN):  ondansetron Injectable 4 milliGRAM(s) IV Push every 6 hours PRN Nausea and/or Vomiting      Allergies    No Known Allergies    Intolerances        Vital Signs Last 24 Hrs  T(C): 36.6 (15 Sep 2017 04:45), Max: 37 (15 Sep 2017 02:22)  T(F): 97.9 (15 Sep 2017 04:45), Max: 98.6 (15 Sep 2017 02:22)  HR: 68 (15 Sep 2017 05:00) (50 - 68)  BP: 140/62 (15 Sep 2017 05:00) (140/62 - 157/70)  BP(mean): 83 (15 Sep 2017 05:00) (83 - 93)  RR: 25 (15 Sep 2017 05:00) (20 - 25)  SpO2: 98% (15 Sep 2017 05:00) (92% - 100%)    LABS:                        9.7    x     )-----------( x        ( 15 Sep 2017 01:23 )             30.4     09-14    138  |  99  |  57.0<H>  ----------------------------<  132<H>  3.8   |  24.0  |  0.79    Ca    9.5      14 Sep 2017 02:34    TPro  6.4<L>  /  Alb  3.8  /  TBili  0.8  /  DBili  x   /  AST  36  /  ALT  40  /  AlkPhos  114  09-14    PT/INR - ( 14 Sep 2017 13:46 )   PT: 20.5 sec;   INR: 1.84 ratio         PTT - ( 14 Sep 2017 02:34 )  PTT:34.0 sec      RADIOLOGY & ADDITIONAL TESTS:

## 2017-09-15 NOTE — DISCHARGE NOTE ADULT - PLAN OF CARE
To prevent further episodes of GI bleed. Please continue with medication as prescribed. Follow up with your primary medical doctor and GI Dr Hernandez within 1 week. Hold coumadin, c/w other home medication. Follow up with your cardiology in 1 week of discharge for further plan on watchman procedure. Please continue with medication as prescribed. Follow up with your primary medical doctor within 1 week. C/w home medication including coumadin, take 2 mg tonight, 2 mg tomorrow and 3 mg on Sunday night, follow up with your PMD on Monday to check INR and adjust coumadin dose based on INR. Follow up with your cardiology in 1 week of discharge for further plan on watchman procedure.

## 2017-09-15 NOTE — PROGRESS NOTE ADULT - ASSESSMENT
Elderly man with A fib, CVA on coumadin, arthritis on celebrex with GI bleed due to gastric ulcer. Improving. Tolerated clear liquids    Advance diet   Oral PPI bid.  Should be discharged on PPI bid for at least 4 weeks and PPI once daily subsequently  EGD in 3 months to document ulcer healing  Await pathology and treat for H. pylori if positive

## 2017-09-15 NOTE — DISCHARGE NOTE ADULT - HOSPITAL COURSE
87y Male with history of HTN, CVA, chronic A fib on AC, HLD, remote DVT, arthritis on celebrex  presented to ED with hematemesis and melena. Evaluated by GI, underwent EGD which showed mild distal esophagitis and clean base gastric ulcer, pathology pending.  Pt coumadin and celebrex kept on hold. Evaluated by Cardiology and recommended keep coumadin on hold at this point and patient deemed stable for discharge from cardiology POV with outpatient follow up for possible watchman device. Pt also deemed stable for discharge from GI stand point with PPI bid for atleast 4 weeks and then once daily. Recommended repeat EGD in 3 months. Pt to follow with Dr Hernandez an outpatient. Pt stated his daughter is very sick and he has to take care of her, he wants to leave today regardless. Pt was explained about the importance of outpatient follow up with PMD / GI /Cardiology at length and verbalize to understand and will make follow up appointments    PHYSICAL EXAM:    Vital Signs Last 24 Hrs  T(C): 36.6 (15 Sep 2017 11:43), Max: 37 (15 Sep 2017 02:22)  T(F): 97.8 (15 Sep 2017 11:43), Max: 98.6 (15 Sep 2017 02:22)  HR: 44 (15 Sep 2017 08:07) (44 - 68)  BP: 128/67 (15 Sep 2017 08:07) (128/67 - 157/70)  BP(mean): 85 (15 Sep 2017 08:07) (83 - 93)  RR: 18 (15 Sep 2017 08:07) (18 - 25)  SpO2: 100% (15 Sep 2017 08:07) (92% - 100%)    GENERAL: Pt lying comfortably, NAD.  CHEST/LUNG: Clear to auscultatation bilaterally; No wheezing.  HEART: S1S2+, Regular rate and rhythm; No murmurs.  ABDOMEN: Soft, Nontender, Nondistended; Bowel sounds present.  MUSCULOSKELETAL: Normal range of motion.  Extremities: No edema, cyanosis or clubbing.  NEURO: AAOX3, no focal deficits, no motor r sensory loss.  PSYCH: normal mood.    Total time spent on discharge 35 minutes. 87y Male with history of HTN, CVA, chronic A fib on AC, HLD, remote DVT, arthritis on celebrex  presented to ED with hematemesis and melena.  Pt coumadin and celebrex kept on hold. Evaluated by GI, underwent EGD which showed mild distal esophagitis and clean base gastric ulcer, pathology pending. GI recommended OK to resume coumadin given clean base ulcer. Evaluated by Cardiology and recommended continue coumadin if Ok with GI. Patient deemed stable for discharge from cardiology POV with outpatient follow up for possible watchman device. Pt also deemed stable for discharge from GI stand point with PPI bid for atleast 4 weeks and then once daily. Recommended repeat EGD in 3 months. Pt to follow with Dr Hernandez an outpatient. Pt stated his daughter is very sick and he has to take care of her, he wants to leave today regardless. Pt was explained about the importance of outpatient follow up with PMD / GI /Cardiology at length and verbalize to understand and will make follow up appointments. Pt advised to see PMD on Monday to check INR and adjust dose per INR reading.    PHYSICAL EXAM:    Vital Signs Last 24 Hrs  T(C): 36.6 (15 Sep 2017 11:43), Max: 37 (15 Sep 2017 02:22)  T(F): 97.8 (15 Sep 2017 11:43), Max: 98.6 (15 Sep 2017 02:22)  HR: 44 (15 Sep 2017 08:07) (44 - 68)  BP: 128/67 (15 Sep 2017 08:07) (128/67 - 157/70)  BP(mean): 85 (15 Sep 2017 08:07) (83 - 93)  RR: 18 (15 Sep 2017 08:07) (18 - 25)  SpO2: 100% (15 Sep 2017 08:07) (92% - 100%)    GENERAL: Pt lying comfortably, NAD.  CHEST/LUNG: Clear to auscultation bilaterally; No wheezing.  HEART: S1S2+, Regular rate and rhythm; No murmurs.  ABDOMEN: Soft, Nontender, Nondistended; Bowel sounds present.  MUSCULOSKELETAL: Normal range of motion.  Extremities: No edema, cyanosis or clubbing.  NEURO: AAOX3, no focal deficits, no motor r sensory loss.  PSYCH: normal mood.    Total time spent on discharge 35 minutes. 87y Male with history of HTN, CVA, chronic A fib on AC, HLD, remote DVT, arthritis on celebrex  presented to ED with hematemesis and melena.  Pt coumadin and celebrex kept on hold. Evaluated by GI, underwent EGD which showed mild distal esophagitis and clean base gastric ulcer, pathology pending. GI recommended OK to resume coumadin given clean base ulcer. Evaluated by Cardiology and recommended continue coumadin if Ok with GI. Patient deemed stable for discharge from cardiology POV with outpatient follow up for possible watchman device. Pt also deemed stable for discharge from GI stand point with PPI bid for atleast 4 weeks and then once daily. Recommended repeat EGD in 3 months. Pt to follow with Dr Hernandez an outpatient. Pt also noted to have thrombocytopenia, baseline plts unkonwn, in 2014 its was 152, ? baseline thrombocytopenia.  Pt stated his daughter is very sick and he has to take care of her, he wants to leave today regardless. Pt was explained about the importance of outpatient follow up with PMD / GI /Cardiology at length and verbalize to understand and will make follow up appointments. Pt advised to see PMD on Monday to check INR and adjust dose per INR reading. Also have your PMD to repeat CBC and monitor hemoglobin and platelet s count.    PHYSICAL EXAM:    Vital Signs Last 24 Hrs  T(C): 36.6 (15 Sep 2017 11:43), Max: 37 (15 Sep 2017 02:22)  T(F): 97.8 (15 Sep 2017 11:43), Max: 98.6 (15 Sep 2017 02:22)  HR: 44 (15 Sep 2017 08:07) (44 - 68)  BP: 128/67 (15 Sep 2017 08:07) (128/67 - 157/70)  BP(mean): 85 (15 Sep 2017 08:07) (83 - 93)  RR: 18 (15 Sep 2017 08:07) (18 - 25)  SpO2: 100% (15 Sep 2017 08:07) (92% - 100%)    GENERAL: Pt lying comfortably, NAD.  CHEST/LUNG: Clear to auscultation bilaterally; No wheezing.  HEART: S1S2+, Regular rate and rhythm; No murmurs.  ABDOMEN: Soft, Nontender, Nondistended; Bowel sounds present.  MUSCULOSKELETAL: Normal range of motion.  Extremities: No edema, cyanosis or clubbing.  NEURO: AAOX3, no focal deficits, no motor r sensory loss.  PSYCH: normal mood.    Total time spent on discharge 35 minutes.

## 2017-09-15 NOTE — DISCHARGE NOTE ADULT - PATIENT PORTAL LINK FT
“You can access the Follow[facility] Patient Portal, offered by [facility], by registering with the following website: [email or web address]”

## 2017-09-15 NOTE — DISCHARGE NOTE ADULT - MEDICATION SUMMARY - MEDICATIONS TO TAKE
I will START or STAY ON the medications listed below when I get home from the hospital:    atenolol 25 mg oral tablet  -- 1 tab(s) by mouth once a day  -- Indication: For A fib / HTN    hydrochlorothiazide 12.5 mg oral tablet  -- 1 tab(s) by mouth once a day  -- Indication: For HTN    ferrous sulfate 325 mg (65 mg elemental iron) oral delayed release tablet  -- 1 tab(s) by mouth once a day  -- Indication: For Home meds    pantoprazole 40 mg oral delayed release tablet  -- 1 tab(s) by mouth 2 times a day (before meals)  -- Indication: For Gastric ulcer    Centrum Silver oral tablet  -- 1 tab(s) by mouth once a day  -- Indication: For Home meds    Calcitrate with D  -- Indication: For Home meds    biotin 1000 mcg oral tablet  -- 2 tab(s) by mouth once a day  -- Indication: For Home meds I will START or STAY ON the medications listed below when I get home from the hospital:    Coumadin 2 mg oral tablet  -- 1 tab(s) by mouth once a day  -- Indication: For A fib    atenolol 25 mg oral tablet  -- 1 tab(s) by mouth once a day  -- Indication: For A fib / HTN    hydrochlorothiazide 12.5 mg oral tablet  -- 1 tab(s) by mouth once a day  -- Indication: For HTN    ferrous sulfate 325 mg (65 mg elemental iron) oral delayed release tablet  -- 1 tab(s) by mouth once a day  -- Indication: For Home meds    pantoprazole 40 mg oral delayed release tablet  -- 1 tab(s) by mouth 2 times a day (before meals)  -- Indication: For Gastric ulcer    Centrum Silver oral tablet  -- 1 tab(s) by mouth once a day  -- Indication: For Home meds    Calcitrate with D  -- Indication: For Home meds    biotin 1000 mcg oral tablet  -- 2 tab(s) by mouth once a day  -- Indication: For Home meds

## 2017-09-15 NOTE — DISCHARGE NOTE ADULT - CARE PROVIDER_API CALL
Pierre Montes), Medicine  [address] [place] [address]  Phone: [phone]  Fax: [phone]    Blane Newman), Cardiovascular Disease; Internal Medicine  [address] [place] [address]  Phone: [phone]  Fax: [phone]    Emory Hernandez), Gastroenterology; Internal Medicine  [address] [place] [address]  Phone: [phone]  Fax: [phone]

## 2017-09-15 NOTE — DISCHARGE NOTE ADULT - MEDICATION SUMMARY - MEDICATIONS TO STOP TAKING
I will STOP taking the medications listed below when I get home from the hospital:    Coumadin 4 mg oral tablet  -- 1 tab(s) by mouth once a day  2 tab 3x week  1.5 tab 4x week    CeleBREX 200 mg oral capsule  -- 1 cap(s) by mouth once a day I will STOP taking the medications listed below when I get home from the hospital:    CeleBREX 200 mg oral capsule  -- 1 cap(s) by mouth once a day

## 2017-09-15 NOTE — PROGRESS NOTE ADULT - SUBJECTIVE AND OBJECTIVE BOX
[facility] - [facility], THE HEART CENTER                                   [address] [place] [address]                                                      PHONE: [phone]                                                         FAX: [phone]  [email or web address]  ---------------------------------------------------------------------------------------------------------------------------------    Overnight events/patient complaints:      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 1000 milliLiter(s) (100 mL/Hr) IV Continuous <Continuous>  atorvastatin 20 milliGRAM(s) Oral at bedtime  ATENolol  Tablet 25 milliGRAM(s) Oral daily  pantoprazole    Tablet 40 milliGRAM(s) Oral two times a day before meals    MEDICATIONS  (PRN):  ondansetron Injectable 4 milliGRAM(s) IV Push every 6 hours PRN Nausea and/or Vomiting      Vital Signs Last 24 Hrs  T(C): 36.7 (15 Sep 2017 08:03), Max: 37 (15 Sep 2017 02:22)  T(F): 98.1 (15 Sep 2017 08:03), Max: 98.6 (15 Sep 2017 02:22)  HR: 44 (15 Sep 2017 08:07) (44 - 68)  BP: 128/67 (15 Sep 2017 08:07) (128/67 - 157/70)  BP(mean): 85 (15 Sep 2017 08:07) (83 - 93)  RR: 18 (15 Sep 2017 08:07) (18 - 25)  SpO2: 100% (15 Sep 2017 08:07) (92% - 100%)  ICU Vital Signs Last 24 Hrs  WILFRED SIGALA  I&O's Detail    14 Sep 2017 07:01  -  15 Sep 2017 07:00  --------------------------------------------------------  IN:    sodium chloride 0.9%.: 500 mL  Total IN: 500 mL    OUT:  Total OUT: 0 mL    Total NET: 500 mL      15 Sep 2017 07:01  -  15 Sep 2017 10:34  --------------------------------------------------------  IN:    sodium chloride 0.9%.: 200 mL  Total IN: 200 mL    OUT:  Total OUT: 0 mL    Total NET: 200 mL        I&O's Summary    14 Sep 2017 07:01  -  15 Sep 2017 07:00  --------------------------------------------------------  IN: 500 mL / OUT: 0 mL / NET: 500 mL    15 Sep 2017 07:01  -  15 Sep 2017 10:34  --------------------------------------------------------  IN: 200 mL / OUT: 0 mL / NET: 200 mL      Drug Dosing Weight  WILFRED SIGALA      PHYSICAL EXAM:  General: Appears well developed, well nourished alert and cooperative.  HEENT: Head; normocephalic, atraumatic.  Eyes: Pupils reactive, cornea wnl.  Neck: Supple, no nodes adenopathy, no NVD or carotid bruit or thyromegaly.  CARDIOVASCULAR: Normal S1 and S2, No murmur, rub, gallop or lift.   LUNGS: No rales, rhonchi or wheeze. Normal breath sounds bilaterally.  ABDOMEN: Soft, nontender without mass or organomegaly. bowel sounds normoactive.  EXTREMITIES: No clubbing, cyanosis or edema. Distal pulses wnl.   SKIN: warm and dry with normal turgor.  NEURO: Alert/oriented x 3/normal motor exam. No pathologic reflexes.    PSYCH: normal affect.        LABS:                        9.7    x     )-----------( x        ( 15 Sep 2017 01:23 )             30.4     09-15    143  |  107  |  32.0<H>  ----------------------------<  94  3.4<L>   |  23.0  |  0.74    Ca    8.4<L>      15 Sep 2017 08:34    TPro  6.4<L>  /  Alb  3.8  /  TBili  0.8  /  DBili  x   /  AST  36  /  ALT  40  /  AlkPhos  114  09-14    WILFRED SIGALA      PT/INR - ( 15 Sep 2017 08:45 )   PT: 26.5 sec;   INR: 2.37 ratio         PTT - ( 14 Sep 2017 02:34 )  PTT:34.0 sec      RADIOLOGY & ADDITIONAL STUDIES:    INTERPRETATION OF TELEMETRY (personally reviewed):       ASSESSMENT AND PLAN:  In summary, In summary, WILFRED SIGALA is an 87y Male with past medical history significant for HTN CVA chronic A fib on AC remote DVT now with significant upper GI bleed    s/p EGD - clean based gastric ulcer  would hold coumadin at this point  will sdiscuss watchman device for this pt       Thank you for allowing [facility] to participate in the care of this patient.  Please feel free to call with any questions or concerns. [place] [facility] - [facility], THE HEART CENTER                                   [address] [place] [address]                                                      PHONE: [phone]                                                         FAX: [phone]  [email or web address]  ---------------------------------------------------------------------------------------------------------------------------------    Overnight events/patient complaints:  no events     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 1000 milliLiter(s) (100 mL/Hr) IV Continuous <Continuous>  atorvastatin 20 milliGRAM(s) Oral at bedtime  ATENolol  Tablet 25 milliGRAM(s) Oral daily  pantoprazole    Tablet 40 milliGRAM(s) Oral two times a day before meals    MEDICATIONS  (PRN):  ondansetron Injectable 4 milliGRAM(s) IV Push every 6 hours PRN Nausea and/or Vomiting      Vital Signs Last 24 Hrs  T(C): 36.7 (15 Sep 2017 08:03), Max: 37 (15 Sep 2017 02:22)  T(F): 98.1 (15 Sep 2017 08:03), Max: 98.6 (15 Sep 2017 02:22)  HR: 44 (15 Sep 2017 08:07) (44 - 68)  BP: 128/67 (15 Sep 2017 08:07) (128/67 - 157/70)  BP(mean): 85 (15 Sep 2017 08:07) (83 - 93)  RR: 18 (15 Sep 2017 08:07) (18 - 25)  SpO2: 100% (15 Sep 2017 08:07) (92% - 100%)  ICU Vital Signs Last 24 Hrs  WILFRED SIGALA  I&O's Detail    14 Sep 2017 07:01  -  15 Sep 2017 07:00  --------------------------------------------------------  IN:    sodium chloride 0.9%.: 500 mL  Total IN: 500 mL    OUT:  Total OUT: 0 mL    Total NET: 500 mL      15 Sep 2017 07:01  -  15 Sep 2017 10:34  --------------------------------------------------------  IN:    sodium chloride 0.9%.: 200 mL  Total IN: 200 mL    OUT:  Total OUT: 0 mL    Total NET: 200 mL        I&O's Summary    14 Sep 2017 07:01  -  15 Sep 2017 07:00  --------------------------------------------------------  IN: 500 mL / OUT: 0 mL / NET: 500 mL    15 Sep 2017 07:01  -  15 Sep 2017 10:34  --------------------------------------------------------  IN: 200 mL / OUT: 0 mL / NET: 200 mL      Drug Dosing Weight  WILFRED SIGALA      PHYSICAL EXAM:  General: Appears well developed, well nourished alert and cooperative.  HEENT: Head; normocephalic, atraumatic.  Eyes: Pupils reactive, cornea wnl.  Neck: Supple, no nodes adenopathy, no NVD or carotid bruit or thyromegaly.  CARDIOVASCULAR: Normal S1 and S2, No murmur, rub, gallop or lift.   LUNGS: No rales, rhonchi or wheeze. Normal breath sounds bilaterally.  ABDOMEN: Soft, nontender without mass or organomegaly. bowel sounds normoactive.  EXTREMITIES: No clubbing, cyanosis or edema. Distal pulses wnl.   SKIN: warm and dry with normal turgor.  NEURO: Alert/oriented x 3/normal motor exam. No pathologic reflexes.    PSYCH: normal affect.        LABS:                        9.7    x     )-----------( x        ( 15 Sep 2017 01:23 )             30.4     09-15    143  |  107  |  32.0<H>  ----------------------------<  94  3.4<L>   |  23.0  |  0.74    Ca    8.4<L>      15 Sep 2017 08:34    TPro  6.4<L>  /  Alb  3.8  /  TBili  0.8  /  DBili  x   /  AST  36  /  ALT  40  /  AlkPhos  114  09-14    WILFRED SIGALA      PT/INR - ( 15 Sep 2017 08:45 )   PT: 26.5 sec;   INR: 2.37 ratio         PTT - ( 14 Sep 2017 02:34 )  PTT:34.0 sec      RADIOLOGY & ADDITIONAL STUDIES:    INTERPRETATION OF TELEMETRY (personally reviewed):       ASSESSMENT AND PLAN:  In summary, In summary, WILFRED SIGALA is an 87y Male with past medical history significant for HTN CVA chronic A fib on AC remote DVT now with significant upper GI bleed    s/p EGD - clean based gastric ulcer  would hold coumadin at this point  will discuss watchman device for this pt as an outpt  pt stable for d/c home from a cardiac perspective     Thank you for allowing [facility] to participate in the care of this patient.  Please feel free to call with any questions or concerns.

## 2017-09-15 NOTE — DISCHARGE NOTE ADULT - SECONDARY DIAGNOSIS.
Chronic atrial fibrillation Essential hypertension Hypercholesterolemia Cerebrovascular accident (CVA), unspecified mechanism

## 2017-10-19 PROCEDURE — 36430 TRANSFUSION BLD/BLD COMPNT: CPT

## 2017-10-19 PROCEDURE — 85018 HEMOGLOBIN: CPT

## 2017-10-19 PROCEDURE — 80053 COMPREHEN METABOLIC PANEL: CPT

## 2017-10-19 PROCEDURE — 99285 EMERGENCY DEPT VISIT HI MDM: CPT | Mod: 25

## 2017-10-19 PROCEDURE — 96374 THER/PROPH/DIAG INJ IV PUSH: CPT | Mod: XU

## 2017-10-19 PROCEDURE — 93005 ELECTROCARDIOGRAM TRACING: CPT

## 2017-10-19 PROCEDURE — 88342 IMHCHEM/IMCYTCHM 1ST ANTB: CPT

## 2017-10-19 PROCEDURE — 88305 TISSUE EXAM BY PATHOLOGIST: CPT

## 2017-10-19 PROCEDURE — 86900 BLOOD TYPING SEROLOGIC ABO: CPT

## 2017-10-19 PROCEDURE — 43239 EGD BIOPSY SINGLE/MULTIPLE: CPT

## 2017-10-19 PROCEDURE — 85610 PROTHROMBIN TIME: CPT

## 2017-10-19 PROCEDURE — 36415 COLL VENOUS BLD VENIPUNCTURE: CPT

## 2017-10-19 PROCEDURE — 71045 X-RAY EXAM CHEST 1 VIEW: CPT

## 2017-10-19 PROCEDURE — 80048 BASIC METABOLIC PNL TOTAL CA: CPT

## 2017-10-19 PROCEDURE — 86901 BLOOD TYPING SEROLOGIC RH(D): CPT

## 2017-10-19 PROCEDURE — 82272 OCCULT BLD FECES 1-3 TESTS: CPT

## 2017-10-19 PROCEDURE — 43753 TX GASTRO INTUB W/ASP: CPT

## 2017-10-19 PROCEDURE — 86850 RBC ANTIBODY SCREEN: CPT

## 2017-10-19 PROCEDURE — P9059: CPT

## 2017-10-19 PROCEDURE — 85730 THROMBOPLASTIN TIME PARTIAL: CPT

## 2017-10-19 PROCEDURE — 85027 COMPLETE CBC AUTOMATED: CPT

## 2017-10-19 PROCEDURE — 96375 TX/PRO/DX INJ NEW DRUG ADDON: CPT | Mod: XU

## 2018-01-23 PROBLEM — I48.91 UNSPECIFIED ATRIAL FIBRILLATION: Chronic | Status: ACTIVE | Noted: 2017-09-14

## 2018-01-23 PROBLEM — H91.92 UNSPECIFIED HEARING LOSS, LEFT EAR: Chronic | Status: ACTIVE | Noted: 2017-09-14

## 2018-01-25 ENCOUNTER — OUTPATIENT (OUTPATIENT)
Dept: OUTPATIENT SERVICES | Facility: HOSPITAL | Age: 83
LOS: 1 days | End: 2018-01-25
Payer: MEDICARE

## 2018-01-25 VITALS
HEART RATE: 51 BPM | RESPIRATION RATE: 16 BRPM | WEIGHT: 171.96 LBS | OXYGEN SATURATION: 99 % | SYSTOLIC BLOOD PRESSURE: 122 MMHG | HEIGHT: 71 IN | DIASTOLIC BLOOD PRESSURE: 60 MMHG | TEMPERATURE: 97 F

## 2018-01-25 DIAGNOSIS — I48.91 UNSPECIFIED ATRIAL FIBRILLATION: ICD-10-CM

## 2018-01-25 DIAGNOSIS — Z95.828 PRESENCE OF OTHER VASCULAR IMPLANTS AND GRAFTS: Chronic | ICD-10-CM

## 2018-01-25 DIAGNOSIS — G56.01 CARPAL TUNNEL SYNDROME, RIGHT UPPER LIMB: ICD-10-CM

## 2018-01-25 DIAGNOSIS — Z96.641 PRESENCE OF RIGHT ARTIFICIAL HIP JOINT: Chronic | ICD-10-CM

## 2018-01-25 DIAGNOSIS — E78.00 PURE HYPERCHOLESTEROLEMIA, UNSPECIFIED: ICD-10-CM

## 2018-01-25 DIAGNOSIS — G56.00 CARPAL TUNNEL SYNDROME, UNSPECIFIED UPPER LIMB: ICD-10-CM

## 2018-01-25 DIAGNOSIS — I10 ESSENTIAL (PRIMARY) HYPERTENSION: ICD-10-CM

## 2018-01-25 DIAGNOSIS — I63.9 CEREBRAL INFARCTION, UNSPECIFIED: ICD-10-CM

## 2018-01-25 LAB
APTT BLD: 29.6 SEC — SIGNIFICANT CHANGE UP (ref 27.5–37.4)
BUN SERPL-MCNC: 18 MG/DL — SIGNIFICANT CHANGE UP (ref 7–23)
CALCIUM SERPL-MCNC: 9.6 MG/DL — SIGNIFICANT CHANGE UP (ref 8.4–10.5)
CHLORIDE SERPL-SCNC: 101 MMOL/L — SIGNIFICANT CHANGE UP (ref 98–107)
CO2 SERPL-SCNC: 29 MMOL/L — SIGNIFICANT CHANGE UP (ref 22–31)
CREAT SERPL-MCNC: 0.82 MG/DL — SIGNIFICANT CHANGE UP (ref 0.5–1.3)
GLUCOSE SERPL-MCNC: 106 MG/DL — HIGH (ref 70–99)
HCT VFR BLD CALC: 43.2 % — SIGNIFICANT CHANGE UP (ref 39–50)
HGB BLD-MCNC: 14.2 G/DL — SIGNIFICANT CHANGE UP (ref 13–17)
INR BLD: 1.22 — HIGH (ref 0.88–1.17)
MCHC RBC-ENTMCNC: 32.6 PG — SIGNIFICANT CHANGE UP (ref 27–34)
MCHC RBC-ENTMCNC: 32.9 % — SIGNIFICANT CHANGE UP (ref 32–36)
MCV RBC AUTO: 99.3 FL — SIGNIFICANT CHANGE UP (ref 80–100)
NRBC # FLD: 0 — SIGNIFICANT CHANGE UP
PLATELET # BLD AUTO: 172 K/UL — SIGNIFICANT CHANGE UP (ref 150–400)
PMV BLD: 11.4 FL — SIGNIFICANT CHANGE UP (ref 7–13)
POTASSIUM SERPL-MCNC: 4.3 MMOL/L — SIGNIFICANT CHANGE UP (ref 3.5–5.3)
POTASSIUM SERPL-SCNC: 4.3 MMOL/L — SIGNIFICANT CHANGE UP (ref 3.5–5.3)
PROTHROM AB SERPL-ACNC: 13.6 SEC — HIGH (ref 9.8–13.1)
RBC # BLD: 4.35 M/UL — SIGNIFICANT CHANGE UP (ref 4.2–5.8)
RBC # FLD: 13.2 % — SIGNIFICANT CHANGE UP (ref 10.3–14.5)
SODIUM SERPL-SCNC: 142 MMOL/L — SIGNIFICANT CHANGE UP (ref 135–145)
WBC # BLD: 6.78 K/UL — SIGNIFICANT CHANGE UP (ref 3.8–10.5)
WBC # FLD AUTO: 6.78 K/UL — SIGNIFICANT CHANGE UP (ref 3.8–10.5)

## 2018-01-25 PROCEDURE — 93010 ELECTROCARDIOGRAM REPORT: CPT

## 2018-01-25 RX ORDER — MULTIVIT-MIN/FERROUS GLUCONATE 9 MG/15 ML
1 LIQUID (ML) ORAL
Qty: 0 | Refills: 0 | COMMUNITY

## 2018-01-25 RX ORDER — FERROUS SULFATE 325(65) MG
1 TABLET ORAL
Qty: 0 | Refills: 0 | COMMUNITY

## 2018-01-25 NOTE — H&P PST ADULT - PSH
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

## 2018-01-25 NOTE — H&P PST ADULT - PMH
Atrial fibrillation    Carpal tunnel syndrome    Deaf, left    Gastric ulcer  2017  Hypercholesterolemia    Hypertension    OA (osteoarthritis)    Stroke  x2 (left ear deaf as residual) 2012

## 2018-01-25 NOTE — H&P PST ADULT - LYMPHATIC
anterior cervical R/supraclavicular L/posterior cervical R/supraclavicular R/posterior cervical L/anterior cervical L

## 2018-01-25 NOTE — H&P PST ADULT - NEGATIVE NEUROLOGICAL SYMPTOMS
no vertigo/no loss of sensation/no headache/no transient paralysis/no weakness/no paresthesias/no generalized seizures/no difficulty walking

## 2018-01-25 NOTE — H&P PST ADULT - NEGATIVE CARDIOVASCULAR SYMPTOMS
no dyspnea on exertion/no chest pain/no peripheral edema/no claudication/no paroxysmal nocturnal dyspnea/no orthopnea/no palpitations

## 2018-01-25 NOTE — H&P PST ADULT - PROBLEM SELECTOR PLAN 2
Case discussed with Dr. Barraza, Dr. Barraza states to keep patient off coumadin tonight and have patient be evaluated by cardiologist tomorrow.  Pt instructed to obtain cardiac eval preop, pt able to verbalize understanding.  Called pt's cardiologist, made appointment for patient to be seen for cardiac eval preop.

## 2018-01-25 NOTE — H&P PST ADULT - HISTORY OF PRESENT ILLNESS
88 yo male with PMH hypertension, hyperlipidemia, atrial fibrillation and hemorraghic stroke x2 in 2012 presents to pre surgical testing.  Pt reports he has been experiencing right hand pain and numbness for about 2 years, progressively becoming worse.  Pt reports he was diagnosed with carpal tunnel syndrome.   Pt is scheduled for right endoscopic carpal tunnel release on 1/29/18.  Patient is a poor historian. 86 yo male with PMH hypertension, hyperlipidemia, atrial fibrillation and hemorraghic stroke x2 in 2012 presents to pre surgical testing.  Pt reports he has been experiencing right hand pain and numbness for about 2 years, progressively becoming worse.  Pt reports he was diagnosed with carpal tunnel syndrome.   Pt is scheduled for right endoscopic carpal tunnel release on 1/29/18.  Patient is a poor historian. Pt reports he discontinued coumadin 5 days ago without being instructed by clinician for upcoming surgery.

## 2018-01-25 NOTE — H&P PST ADULT - PROBLEM SELECTOR PLAN 5
Pt instructed to take atenolol AM of surgery with a sip of water.   Pt met STOP BANG score for KIM precaution. OR booking notified via fax.

## 2018-01-25 NOTE — H&P PST ADULT - PROBLEM SELECTOR PLAN 1
Pt is scheduled for right endoscopic carpal tunnel release on 1/29/18.  Pre op instructions including chlorhexidine wash given and pt able to verbalize understanding.

## 2018-01-25 NOTE — H&P PST ADULT - ABILITY TO HEAR (WITH HEARING AID OR HEARING APPLIANCE IF NORMALLY USED):
left ear hearing loss/Mildly to Moderately Impaired: difficulty hearing in some environments or speaker may need to increase volume or speak distinctly

## 2018-01-25 NOTE — H&P PST ADULT - FAMILY HISTORY
Father  Still living? No  Family history of active rheumatic fever, Age at diagnosis: Age Unknown     Mother  Still living? No  Family history of hepatitis, Age at diagnosis: Age Unknown     Sibling  Still living? No  Family history of brain cancer, Age at diagnosis: Age Unknown  Family history of throat cancer, Age at diagnosis: Age Unknown  Family history of cancer, Age at diagnosis: Age Unknown

## 2018-01-25 NOTE — H&P PST ADULT - MUSCULOSKELETAL
details… detailed exam no joint warmth/no calf tenderness/decreased ROM/no joint erythema/left shoulder, left knee, right hand and wrist/no joint swelling

## 2018-01-29 ENCOUNTER — TRANSCRIPTION ENCOUNTER (OUTPATIENT)
Age: 83
End: 2018-01-29

## 2018-01-29 ENCOUNTER — OUTPATIENT (OUTPATIENT)
Dept: OUTPATIENT SERVICES | Facility: HOSPITAL | Age: 83
LOS: 1 days | Discharge: HOME | End: 2018-01-29

## 2018-01-29 VITALS
RESPIRATION RATE: 16 BRPM | DIASTOLIC BLOOD PRESSURE: 63 MMHG | TEMPERATURE: 98 F | SYSTOLIC BLOOD PRESSURE: 158 MMHG | HEART RATE: 51 BPM | HEIGHT: 71 IN | OXYGEN SATURATION: 98 % | WEIGHT: 171.96 LBS

## 2018-01-29 VITALS
HEART RATE: 79 BPM | OXYGEN SATURATION: 98 % | RESPIRATION RATE: 12 BRPM | DIASTOLIC BLOOD PRESSURE: 78 MMHG | SYSTOLIC BLOOD PRESSURE: 160 MMHG

## 2018-01-29 DIAGNOSIS — Z95.828 PRESENCE OF OTHER VASCULAR IMPLANTS AND GRAFTS: Chronic | ICD-10-CM

## 2018-01-29 DIAGNOSIS — G56.01 CARPAL TUNNEL SYNDROME, RIGHT UPPER LIMB: ICD-10-CM

## 2018-01-29 DIAGNOSIS — Z96.641 PRESENCE OF RIGHT ARTIFICIAL HIP JOINT: Chronic | ICD-10-CM

## 2018-01-29 RX ORDER — ONDANSETRON 8 MG/1
1 TABLET, FILM COATED ORAL
Qty: 15 | Refills: 0 | OUTPATIENT
Start: 2018-01-29

## 2018-01-29 NOTE — ASU DISCHARGE PLAN (ADULT/PEDIATRIC). - NOTIFY
Fever greater than 101/Persistent Nausea and Vomiting/Numbness, color, or temperature change to extremity/Bleeding that does not stop/Swelling that continues/Pain not relieved by Medications/Inability to Tolerate Liquids or Foods/Unable to Urinate

## 2018-01-29 NOTE — ASU DISCHARGE PLAN (ADULT/PEDIATRIC). - NURSING INSTRUCTIONS
narcotics may cause constipation - take stool softener; increase fluids and fiber.  take narcotics with food to prevent nausea.

## 2018-01-29 NOTE — ASU DISCHARGE PLAN (ADULT/PEDIATRIC). - SPECIAL INSTRUCTIONS
Apply ice for 20 minutes several times per day for the next 24-48 hours, then as needed for comfort. Narcotic pain medication may cause nausea or constipation. Take medication with food. Increase fluids and fiber intake. No creams, lotions, powders  or ointments to incision site. Do not take additional tylenol pain meds contains tylenol

## 2018-02-02 ENCOUNTER — EMERGENCY (EMERGENCY)
Facility: HOSPITAL | Age: 83
LOS: 1 days | Discharge: HOME | End: 2018-02-02
Attending: EMERGENCY MEDICINE
Payer: MEDICARE

## 2018-02-02 VITALS
TEMPERATURE: 98 F | SYSTOLIC BLOOD PRESSURE: 144 MMHG | RESPIRATION RATE: 18 BRPM | HEIGHT: 71 IN | DIASTOLIC BLOOD PRESSURE: 69 MMHG | OXYGEN SATURATION: 99 % | WEIGHT: 171.08 LBS | HEART RATE: 68 BPM

## 2018-02-02 DIAGNOSIS — Z96.641 PRESENCE OF RIGHT ARTIFICIAL HIP JOINT: Chronic | ICD-10-CM

## 2018-02-02 DIAGNOSIS — Z95.828 PRESENCE OF OTHER VASCULAR IMPLANTS AND GRAFTS: Chronic | ICD-10-CM

## 2018-02-02 LAB
ALBUMIN SERPL ELPH-MCNC: 4 G/DL — SIGNIFICANT CHANGE UP (ref 3.3–5.2)
ALP SERPL-CCNC: 133 U/L — HIGH (ref 40–120)
ALT FLD-CCNC: 22 U/L — SIGNIFICANT CHANGE UP
ANION GAP SERPL CALC-SCNC: 14 MMOL/L — SIGNIFICANT CHANGE UP (ref 5–17)
APTT BLD: 30.2 SEC — SIGNIFICANT CHANGE UP (ref 27.5–37.4)
AST SERPL-CCNC: 34 U/L — SIGNIFICANT CHANGE UP
BASOPHILS # BLD AUTO: 0 K/UL — SIGNIFICANT CHANGE UP (ref 0–0.2)
BASOPHILS NFR BLD AUTO: 0.4 % — SIGNIFICANT CHANGE UP (ref 0–2)
BILIRUB SERPL-MCNC: 0.5 MG/DL — SIGNIFICANT CHANGE UP (ref 0.4–2)
BUN SERPL-MCNC: 19 MG/DL — SIGNIFICANT CHANGE UP (ref 8–20)
CALCIUM SERPL-MCNC: 9.6 MG/DL — SIGNIFICANT CHANGE UP (ref 8.6–10.2)
CHLORIDE SERPL-SCNC: 103 MMOL/L — SIGNIFICANT CHANGE UP (ref 98–107)
CK SERPL-CCNC: 117 U/L — SIGNIFICANT CHANGE UP (ref 30–200)
CO2 SERPL-SCNC: 26 MMOL/L — SIGNIFICANT CHANGE UP (ref 22–29)
CREAT SERPL-MCNC: 0.66 MG/DL — SIGNIFICANT CHANGE UP (ref 0.5–1.3)
EOSINOPHIL # BLD AUTO: 0.1 K/UL — SIGNIFICANT CHANGE UP (ref 0–0.5)
EOSINOPHIL NFR BLD AUTO: 2 % — SIGNIFICANT CHANGE UP (ref 0–5)
GLUCOSE SERPL-MCNC: 101 MG/DL — SIGNIFICANT CHANGE UP (ref 70–115)
HCT VFR BLD CALC: 43.4 % — SIGNIFICANT CHANGE UP (ref 42–52)
HGB BLD-MCNC: 13.9 G/DL — LOW (ref 14–18)
INR BLD: 1.38 RATIO — HIGH (ref 0.88–1.16)
LYMPHOCYTES # BLD AUTO: 1.2 K/UL — SIGNIFICANT CHANGE UP (ref 1–4.8)
LYMPHOCYTES # BLD AUTO: 16.6 % — LOW (ref 20–55)
MCHC RBC-ENTMCNC: 31.9 PG — HIGH (ref 27–31)
MCHC RBC-ENTMCNC: 32 G/DL — SIGNIFICANT CHANGE UP (ref 32–36)
MCV RBC AUTO: 99.5 FL — HIGH (ref 80–94)
MONOCYTES # BLD AUTO: 0.7 K/UL — SIGNIFICANT CHANGE UP (ref 0–0.8)
MONOCYTES NFR BLD AUTO: 10.1 % — HIGH (ref 3–10)
NEUTROPHILS # BLD AUTO: 5.1 K/UL — SIGNIFICANT CHANGE UP (ref 1.8–8)
NEUTROPHILS NFR BLD AUTO: 70.8 % — SIGNIFICANT CHANGE UP (ref 37–73)
PLATELET # BLD AUTO: 173 K/UL — SIGNIFICANT CHANGE UP (ref 150–400)
POTASSIUM SERPL-MCNC: 4.2 MMOL/L — SIGNIFICANT CHANGE UP (ref 3.5–5.3)
POTASSIUM SERPL-SCNC: 4.2 MMOL/L — SIGNIFICANT CHANGE UP (ref 3.5–5.3)
PROT SERPL-MCNC: 6.9 G/DL — SIGNIFICANT CHANGE UP (ref 6.6–8.7)
PROTHROM AB SERPL-ACNC: 15.3 SEC — HIGH (ref 9.8–12.7)
RBC # BLD: 4.36 M/UL — LOW (ref 4.6–6.2)
RBC # FLD: 13.6 % — SIGNIFICANT CHANGE UP (ref 11–15.6)
SODIUM SERPL-SCNC: 143 MMOL/L — SIGNIFICANT CHANGE UP (ref 135–145)
TROPONIN T SERPL-MCNC: <0.01 NG/ML — SIGNIFICANT CHANGE UP (ref 0–0.06)
WBC # BLD: 7.2 K/UL — SIGNIFICANT CHANGE UP (ref 4.8–10.8)
WBC # FLD AUTO: 7.2 K/UL — SIGNIFICANT CHANGE UP (ref 4.8–10.8)

## 2018-02-02 PROCEDURE — 70450 CT HEAD/BRAIN W/O DYE: CPT | Mod: 26

## 2018-02-02 PROCEDURE — 93005 ELECTROCARDIOGRAM TRACING: CPT

## 2018-02-02 PROCEDURE — 99284 EMERGENCY DEPT VISIT MOD MDM: CPT | Mod: 25

## 2018-02-02 PROCEDURE — 82550 ASSAY OF CK (CPK): CPT

## 2018-02-02 PROCEDURE — 70551 MRI BRAIN STEM W/O DYE: CPT | Mod: 26

## 2018-02-02 PROCEDURE — 85610 PROTHROMBIN TIME: CPT

## 2018-02-02 PROCEDURE — 85730 THROMBOPLASTIN TIME PARTIAL: CPT

## 2018-02-02 PROCEDURE — 84484 ASSAY OF TROPONIN QUANT: CPT

## 2018-02-02 PROCEDURE — 36415 COLL VENOUS BLD VENIPUNCTURE: CPT

## 2018-02-02 PROCEDURE — 93010 ELECTROCARDIOGRAM REPORT: CPT

## 2018-02-02 PROCEDURE — 99284 EMERGENCY DEPT VISIT MOD MDM: CPT

## 2018-02-02 PROCEDURE — 70551 MRI BRAIN STEM W/O DYE: CPT

## 2018-02-02 PROCEDURE — 85027 COMPLETE CBC AUTOMATED: CPT

## 2018-02-02 PROCEDURE — 80053 COMPREHEN METABOLIC PANEL: CPT

## 2018-02-02 PROCEDURE — 70450 CT HEAD/BRAIN W/O DYE: CPT

## 2018-02-02 RX ORDER — WARFARIN SODIUM 2.5 MG/1
8 TABLET ORAL ONCE
Qty: 0 | Refills: 0 | Status: COMPLETED | OUTPATIENT
Start: 2018-02-02 | End: 2018-02-02

## 2018-02-02 RX ORDER — ROSUVASTATIN CALCIUM 5 MG/1
0 TABLET ORAL
Qty: 0 | Refills: 0 | COMMUNITY

## 2018-02-02 RX ORDER — WARFARIN SODIUM 2.5 MG/1
0 TABLET ORAL
Qty: 0 | Refills: 0 | COMMUNITY

## 2018-02-02 RX ORDER — ATENOLOL 25 MG/1
0 TABLET ORAL
Qty: 0 | Refills: 0 | COMMUNITY

## 2018-02-02 RX ADMIN — WARFARIN SODIUM 8 MILLIGRAM(S): 2.5 TABLET ORAL at 21:54

## 2018-02-02 NOTE — ED PROVIDER NOTE - CARE PLAN
Principal Discharge DX:	Tinnitus  Secondary Diagnosis:	Atrial fibrillation  Secondary Diagnosis:	INR (international normal ratio) abnormal

## 2018-02-02 NOTE — ED PROVIDER NOTE - ATTENDING CONTRIBUTION TO CARE
Pt with right ear reduced hearing and tinnitis. Pt states that he had the same condition just prior to his hemorrhagic CVA years ago. VSS. neuro A&Ox3. Lucid, fluent speech. a/p tinnitis

## 2018-02-02 NOTE — ED ADULT NURSE REASSESSMENT NOTE - NS ED NURSE REASSESS COMMENT FT1
Assumed pt care @ 1930 from DION Ramos. Pt is at Echo and will be going up to 4 bracket upon return to the ED. Will assess upon arrival.

## 2018-02-02 NOTE — ED ADULT NURSE REASSESSMENT NOTE - NS ED NURSE REASSESS COMMENT FT1
Assumed pt care @ 1930 from DION Ramos. Pt is A&Ox3 in NAD, Afib on cardiac monitor in the 50s. Pt denies complaint.  IV clean dry and intact, flushes without difficulty. Safety maintained, Bed locked in lowest position. Will continue to monitor.

## 2018-02-02 NOTE — ED PROVIDER NOTE - PROGRESS NOTE DETAILS
MRI results as noted.  Pt states tinnitus less and stable for d/c with ENT f/u as outpt.  Pt advised of INR and need to f/u with medical doctor

## 2018-02-02 NOTE — ED ADULT TRIAGE NOTE - CHIEF COMPLAINT QUOTE
c/o yesterday hearing loss in right ear , stuffy feeling in head, and when eh talks feels like its a high pitched sound, and peoples voices sound different, symptoms started yesterday

## 2018-02-02 NOTE — ED PROVIDER NOTE - OBJECTIVE STATEMENT
88 y/o M with PMH a-fib and hemorrhagic stroke 5-6 years ago, presents with c/o decreased hearing in right ear since yesterday.  Patient states that the same thing happened to him 2-3 days prior to his CVA 5-6 years ago.  Patient had right carpal tunnel release on Monday.  He stopped his coumadin 7 days prior on his own.  He restarted it 3 days ago.  He had shooting pain up the left side of his neck 2 days ago and then the hearing in his right ear decreased by half.  He denies and extremity weakness.

## 2018-02-02 NOTE — ED ADULT NURSE REASSESSMENT NOTE - NS ED NURSE REASSESS COMMENT FT1
Pt returned from MRI, states the ringing has subsided. Dr. Wilson aware. Pt okay to eat. Will continue to monitor.

## 2018-02-02 NOTE — ED ADULT NURSE NOTE - OBJECTIVE STATEMENT
87 yom AOx3  presents to ed complaining of left ear ringing in ear and changes in pitch of noise denies numbness denies tingling. denies sob, facial symmetry,  equal strength bilaterally upper and lower extremities. + pulse + sensation all 4 extremities. patient has bilateral hearing aids, pt states he has had his hearing aids checked. denies recent trauma. pt reports having had stopped taking coumadin against medical advice prior to wrist surgery- carpal tunnel. steri strip intact site clean dry

## 2018-04-09 NOTE — DISCHARGE NOTE ADULT - THE PATIENT HAS RECEIVED WRITTEN DISCHARGE INSTRUCTIONS FOR WARFARIN/COUMADIN, INCLUDING THE WARFARIN/COUMADIN DISCHARGE BOOKLET, WHICH CONTAINS ALL OF THE INFORMATION LISTED BELOW.EPING
Subjective





- Date & Time of Evaluation


Date of Evaluation: 04/09/18


Time of Evaluation: 08:04





- Subjective


Subjective: 





Surgery 





Patient seen and examined. No acute events. Complains of pain on the surgical 

site. Dressing changed this AM. 





Objective





- Vital Signs/Intake and Output


Vital Signs (last 24 hours): 


 











Temp Pulse Resp BP Pulse Ox


 


 98 F   80   16   129/66   92 L


 


 04/09/18 07:55  04/09/18 07:55  04/09/18 07:55  04/09/18 07:55  04/09/18 07:55








Intake and Output: 


 











 04/09/18 04/09/18





 06:59 18:59


 


Intake Total 450 


 


Output Total 2350 


 


Balance -1900 














- Medications


Medications: 


 Current Medications





Acetaminophen (Tylenol 325mg Tab)  650 mg PO Q6 PRN


   PRN Reason: Pain, Mild (1-3)


Al Hydrox/Mg Hydrox/Simethicone (Maalox Plus 30 Ml)  30 ml PO DAILY PRN


   PRN Reason: Indigestion / Heartburn


   Last Admin: 04/06/18 18:34 Dose:  30 ml


Amlodipine Besylate (Norvasc)  10 mg PO DAILY [facility]


   Last Admin: 04/08/18 10:03 Dose:  10 mg


Aspirin (Ecotrin)  81 mg PO 0800 [facility]


   Last Admin: 04/08/18 10:03 Dose:  81 mg


Atorvastatin Calcium (Lipitor)  40 mg PO DIN [facility]


   Last Admin: 04/08/18 17:22 Dose:  40 mg


Chlorpromazine (Thorazine)  50 mg PO Q6 PRN; Protocol


   PRN Reason: Hiccups


Clopidogrel Bisulfate (Plavix)  75 mg PO DAILY [facility]


   Last Admin: 04/08/18 10:01 Dose:  75 mg


Docusate Sodium (Colace)  100 mg PO DAILY [facility]


   Last Admin: 04/08/18 10:01 Dose:  100 mg


Escitalopram Oxalate (Lexapro)  5 mg PO QAM [facility]


   Last Admin: 04/08/18 10:01 Dose:  5 mg


Famotidine (Pepcid)  20 mg PO QAM [facility]


   Last Admin: 04/08/18 10:01 Dose:  20 mg


Heparin Sodium (Porcine) (Heparin)  5,000 units SC Q12 MARTÍN


   PRN Reason: Protocol


   Last Admin: 04/08/18 21:33 Dose:  5,000 units


Hydromorphone HCl (Dilaudid)  0.5 mg IVP Q4H PRN


   PRN Reason: Pain, moderate (4-7)


   Last Admin: 04/06/18 14:12 Dose:  0.5 mg


Hydromorphone HCl (Dilaudid 0.2 Mg/Ml Pca)  30 mls @ 0 mls/hr IV PRN PRN; 

Protocol


   PRN Reason: PCA PER MD ORDER


   Last Admin: 04/09/18 00:00 Dose:  0.0001 mls/hr


Ciprofloxacin (Cipro 400mg/200ml Dsw)  400 mg in 200 mls @ 133.333 mls/hr IVPB 

Q12 MARTÍN


   PRN Reason: Protocol


   Last Admin: 04/08/18 21:33 Dose:  133.333 mls/hr


Insulin Human Regular (Humulin R Low)  0 units SC ACHS MARTÍN


   PRN Reason: Protocol


   Last Admin: 04/08/18 22:39 Dose:  Not Given


Insulin Lispro Protam/Lispro Human (Humalog Mix 75/25)  8 units SC ACBD [facility]


   Last Admin: 04/08/18 17:20 Dose:  8 unit


Metoprolol Tartrate (Lopressor)  50 mg PO 0800,1800 [facility]


   Last Admin: 04/08/18 17:23 Dose:  50 mg


Non-Formulary Medication (Hydrocodone Bitartrate [Hysingla Er])  20 mg PO DAILY 

[facility]


   Last Admin: 04/08/18 10:03 Dose:  Not Given


Mycophenolate Sodium [Myfortic] 180mg ( Home Med)  720 mg PO BID [facility]


   Last Admin: 04/08/18 17:25 Dose:  720 mg


Ondansetron HCl (Zofran Inj)  4 mg IVP Q4H PRN


   PRN Reason: Nausea/Vomiting


   Last Admin: 04/09/18 06:31 Dose:  4 mg


Oxycodone HCl (Oxycodone Immediate Release Tab)  10 mg PO Q6H PRN


   PRN Reason: Pain, moderate (4-7)


   Last Admin: 04/07/18 09:39 Dose:  10 mg


Polyethylene Glycol (Miralax)  17 gm PO BID [facility]


   Last Admin: 04/08/18 17:22 Dose:  17 gm


Prednisone (Prednisone Tab)  5 mg PO 0800 [facility]


   Last Admin: 04/08/18 10:03 Dose:  5 mg


Pregabalin (Lyrica)  100 mg PO BID [facility]


   Last Admin: 04/08/18 17:22 Dose:  100 mg


Sodium Hypochlorite (Dakins Solution 0.25%)  0 ml TOP DAILY [facility]


   Last Admin: 04/08/18 10:03 Dose:  1 applic


Tacrolimus (Prograf Cap)  0.5 mg PO HS [facility]


   Last Admin: 04/08/18 21:33 Dose:  0.5 mg


Tacrolimus (Prograf Cap)  2 mg PO BID [facility]


   Last Admin: 04/08/18 17:22 Dose:  2 mg


Tramadol HCl (Ultram)  50 mg PO Q4 PRN


   PRN Reason: Pain, moderate (4-7)











- Labs


Labs: 


 





 04/09/18 06:20 





 04/09/18 06:20 





 











PT  13.1 SECONDS (9.4-12.5)  H  04/05/18  06:00    


 


INR  1.14  (0.93-1.08)  H  04/05/18  06:00    


 


APTT  35.3 Seconds (25.1-36.5)   04/05/18  06:00    














- Constitutional


Appears: No Acute Distress





- Head Exam


Head Exam: ATRAUMATIC, NORMAL INSPECTION, NORMOCEPHALIC





- Eye Exam


Eye Exam: EOMI, Normal appearance, PERRL


Pupil Exam: NORMAL ACCOMODATION, PERRL





- ENT Exam


ENT Exam: Mucous Membranes Moist, Normal Exam





- Neck Exam


Neck Exam: Full ROM, Normal Inspection.  absent: Lymphadenopathy





- Respiratory Exam


Respiratory Exam: Clear to Ausculation Bilateral, NORMAL BREATHING PATTERN





- Cardiovascular Exam


Cardiovascular Exam: REGULAR RHYTHM, +S1, +S2.  absent: Murmur





- GI/Abdominal Exam


GI & Abdominal Exam: Soft, Normal Bowel Sounds.  absent: Distended, Firm, 

Guarding, Rigid, Tenderness, Rebound





- Extremities Exam


Extremities Exam: Full ROM, Normal Capillary Refill, Tenderness.  absent: Joint 

Swelling, Normal Inspection, Pedal Edema


Additional comments: 





L foot. Dressing C/D/I. L TMA. necrotic tissue. Mild drainage. R BKA stump. C/D/

I. Staples in place.  





- Back Exam


Back Exam: NORMAL INSPECTION





- Neurological Exam


Neurological Exam: Alert, Awake, CN II-XII Intact, Normal Gait, Oriented x3





- Psychiatric Exam


Psychiatric exam: Normal Affect, Normal Mood





- Skin


Skin Exam: Dry, Intact, Normal Color, Warm





Assessment and Plan





- Assessment and Plan (Free Text)


Assessment: 





59M POD#3 s/p right bka

















Plan: 





will continue dressing changes BID at bedside with Dakin solution on L TMA 

site. 


Pain control


knee immobilizer to stay in place 


Medical management 





will d/w Dr Prescott Statement Selected

## 2018-12-13 NOTE — ED ADULT NURSE REASSESSMENT NOTE - COMFORT CARE
warm blanket provided/repositioned/plan of care explained mild right sided fullness and asymmetry of anterior neck

## 2019-04-16 NOTE — ASU DISCHARGE PLAN (ADULT/PEDIATRIC). - I HAVE READ AND UNDERSTAND THE ABOVE INSTRUCTIONS AND I UNDERSTAND IT IS IMPORTANT TO FOLLOW THESE INSTRUCTIONS
Addended by: DOMINIK GUADARRAMA on: 4/16/2019 02:26 PM     Modules accepted: Orders     Statement Selected

## 2019-06-05 NOTE — ASU PREOPERATIVE ASSESSMENT, ADULT ([facility] ONLY) - TEACHING/LEARNING EDUCATIONAL LEVEL
[facility] Closure 2 Information: This tab is for additional flaps and grafts, including complex repair and grafts and complex repair and flaps. You can also specify a different location for the additional defect, if the location is the same you do not need to select a new one. We will insert the automated text for the repair you select below just as we do for solitary flaps and grafts. Please note that at this time if you select a location with a different insurance zone you will need to override the ICD10 and CPT if appropriate.

## 2019-07-31 NOTE — DISCHARGE NOTE ADULT - ABILITY TO HEAR (WITH HEARING AID OR HEARING APPLIANCE IF NORMALLY USED):
complains of pain/discomfort Mildly to Moderately Impaired: difficulty hearing in some environments or speaker may need to increase volume or speak distinctly

## 2019-10-23 NOTE — ED ADULT NURSE NOTE - NS ED NURSE LEVEL OF CONSCIOUSNESS MENTAL STATUS
Alert/Awake
pt verbalizes an understanding not to take own meds. Meds sent home with wife./sent home w/ family/friend

## 2023-11-22 NOTE — H&P PST ADULT - NEGATIVE HEMATOLOGY SYMPTOMS
Alert-The patient is alert, awake and responds to voice. The patient is oriented to time, place, and person. The triage nurse is able to obtain subjective information. no gum bleeding/no nose bleeding